# Patient Record
Sex: FEMALE | Race: WHITE | Employment: UNEMPLOYED | URBAN - METROPOLITAN AREA
[De-identification: names, ages, dates, MRNs, and addresses within clinical notes are randomized per-mention and may not be internally consistent; named-entity substitution may affect disease eponyms.]

---

## 2018-10-19 PROBLEM — E66.01 SEVERE OBESITY (HCC): Status: ACTIVE | Noted: 2018-10-19

## 2021-09-02 ENCOUNTER — HOSPITAL ENCOUNTER (OUTPATIENT)
Dept: GENERAL RADIOLOGY | Age: 61
Discharge: HOME | End: 2021-09-02
Payer: COMMERCIAL

## 2021-09-02 DIAGNOSIS — J18.9 PNEUMONIA DUE TO INFECTIOUS ORGANISM, UNSPECIFIED LATERALITY, UNSPECIFIED PART OF LUNG: ICD-10-CM

## 2021-09-02 DIAGNOSIS — U09.9 POST-COVID SYNDROME: ICD-10-CM

## 2021-09-02 PROCEDURE — 71046 X-RAY EXAM CHEST 2 VIEWS: CPT

## 2022-01-25 ENCOUNTER — HOSPITAL ENCOUNTER (OUTPATIENT)
Dept: GENERAL RADIOLOGY | Age: 62
Discharge: HOME | End: 2022-01-25
Payer: COMMERCIAL

## 2022-01-25 DIAGNOSIS — J18.9 HOSPITAL ACQUIRED PNA: ICD-10-CM
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
## 2022-01-25 PROCEDURE — 71046 X-RAY EXAM CHEST 2 VIEWS: CPT

## 2022-03-19 PROBLEM — E66.01 SEVERE OBESITY (HCC): Status: ACTIVE | Noted: 2018-10-19

## 2022-03-29 ENCOUNTER — HOSPITAL ENCOUNTER (OUTPATIENT)
Dept: CT IMAGING | Age: 62
Discharge: HOME | End: 2022-03-29
Attending: NURSE PRACTITIONER
Payer: COMMERCIAL

## 2022-03-29 DIAGNOSIS — U09.9 POST-COVID SYNDROME: ICD-10-CM

## 2022-03-29 PROCEDURE — 71250 CT THORAX DX C-: CPT

## 2022-04-07 NOTE — PROGRESS NOTES
Please let patient know there are some changes in the lungs that could be from [place]. Also, with elevated diaphragm on one side, likely to have shortness of breath due to that. CT scan also questioned if you have possible asthma or airway issue, so will see what your breathing tests look like.

## 2022-06-06 ENCOUNTER — OFFICE VISIT (OUTPATIENT)
Dept: INTERNAL MEDICINE CLINIC | Facility: CLINIC | Age: 62
End: 2022-06-06
Payer: COMMERCIAL

## 2022-06-06 VITALS
SYSTOLIC BLOOD PRESSURE: 128 MMHG | OXYGEN SATURATION: 95 % | HEART RATE: 66 BPM | BODY MASS INDEX: 35.32 KG/M2 | RESPIRATION RATE: 16 BRPM | HEIGHT: 65 IN | DIASTOLIC BLOOD PRESSURE: 80 MMHG | TEMPERATURE: 97.3 F | WEIGHT: 212 LBS

## 2022-06-06 DIAGNOSIS — Z79.899 ENCOUNTER FOR LONG-TERM (CURRENT) USE OF MEDICATIONS: ICD-10-CM

## 2022-06-06 DIAGNOSIS — U09.9 POST-COVID SYNDROME: ICD-10-CM

## 2022-06-06 DIAGNOSIS — E03.9 ACQUIRED HYPOTHYROIDISM: Primary | ICD-10-CM

## 2022-06-06 DIAGNOSIS — F33.0 MILD EPISODE OF RECURRENT MAJOR DEPRESSIVE DISORDER (HCC): ICD-10-CM

## 2022-06-06 PROCEDURE — 99213 OFFICE O/P EST LOW 20 MIN: CPT | Performed by: INTERNAL MEDICINE

## 2022-06-06 ASSESSMENT — PATIENT HEALTH QUESTIONNAIRE - PHQ9
1. LITTLE INTEREST OR PLEASURE IN DOING THINGS: 0
2. FEELING DOWN, DEPRESSED OR HOPELESS: 0
SUM OF ALL RESPONSES TO PHQ9 QUESTIONS 1 & 2: 0
SUM OF ALL RESPONSES TO PHQ QUESTIONS 1-9: 0

## 2022-06-06 NOTE — PROGRESS NOTES
06/06/2022   Location:[facility] [address] [facility]  [place]  Patient #:  [id number]  YOB: 1960        History of Present Illness     Chief Complaint   Patient presents with    Follow-up Chronic Condition     4 month f/u    Post-COVID Symptoms       Ms. Otoole is a 65 y.o. female  who presents for Follow up on chronic medical issues. There is compliance and tolerance with medications. Chronic active medical issues assessed today include  Hypothyroidism, anxiety/depression, pre diabetes, hyperlipidemia. Solis Chang compliant with synthroid taking on an empty stomach. She compliant with Lexapro for anxiety and depression. Synthroid tues/friday takes 1/2 whole on other days. Still has some SOB with activity since COVID last summer. Post COVID disorder  Still fatigued going to gym regularly 5d /w  Still complains of brain fog. Annoying type HA that goes away. Saw pulmonologist 3 weeks ago. Her right hemidiaphragm is elevated. Has PFT not normal. Will repeat study in 6 months. Maybe slowing improving. RA sats 92 - 98.  Avg 93%      Last Labs  CBC:   Lab Results   Component Value Date    WBC 8.0 02/22/2022    RBC 4.80 02/22/2022    HGB 13.9 02/22/2022    HCT 43.9 02/22/2022    MCV 92 02/22/2022    MCH 29.0 02/22/2022    MCHC 31.7 02/22/2022    RDW 13.8 02/22/2022     02/22/2022     CMP:    Lab Results   Component Value Date     02/07/2022    K 5.0 02/07/2022     02/07/2022    CO2 23 02/07/2022    BUN 10 02/07/2022    CREATININE 0.71 02/07/2022    GFRAA 107 02/07/2022    AGRATIO 1.6 03/27/2020    GLUCOSE 122 02/07/2022    PROT 7.5 03/27/2020    LABALBU 4.6 03/27/2020    CALCIUM 10.2 02/07/2022    BILITOT 0.5 03/27/2020    ALKPHOS 67 03/27/2020    AST 32 03/27/2020    ALT 23 03/27/2020     FLP:    Lab Results   Component Value Date    TRIG 122 02/22/2022    HDL 46 02/22/2022    LDLCALC 129 02/22/2022    LABVLDL 26 03/27/2020     TSH:    Lab Results Component Value Date    TSH 2.160 02/07/2022       No Known Allergies  Past Medical History:   Diagnosis Date    Depression     Hypothyroidism      Social History     Socioeconomic History    Marital status:      Spouse name: None    Number of children: None    Years of education: None    Highest education level: None   Occupational History    None   Tobacco Use    Smoking status: Never Smoker    Smokeless tobacco: Never Used   Substance and Sexual Activity    Alcohol use: No    Drug use: No    Sexual activity: None   Other Topics Concern    None   Social History Narrative    None     Social Determinants of Health     Financial Resource Strain:     Difficulty of Paying Living Expenses: Not on file   Food Insecurity:     Worried About Running Out of Food in the Last Year: Not on file    Jodi of Food in the Last Year: Not on file   Transportation Needs:     Lack of Transportation (Medical): Not on file    Lack of Transportation (Non-Medical):  Not on file   Physical Activity:     Days of Exercise per Week: Not on file    Minutes of Exercise per Session: Not on file   Stress:     Feeling of Stress : Not on file   Social Connections:     Frequency of Communication with Friends and Family: Not on file    Frequency of Social Gatherings with Friends and Family: Not on file    Attends Buddhism Services: Not on file    Active Member of [address] [place] [address] or Organizations: Not on file    Attends Club or Organization Meetings: Not on file    Marital Status: Not on file   Intimate Partner Violence:     Fear of Current or Ex-Partner: Not on file    Emotionally Abused: Not on file    Physically Abused: Not on file    Sexually Abused: Not on file   Housing Stability:     Unable to Pay for Housing in the Last Year: Not on file    Number of [place] in the Last Year: Not on file    Unstable Housing in the Last Year: Not on file     Past Surgical History:   Procedure Laterality Date    CHOLECYSTECTOMY  1990     Family History   Problem Relation Age of Onset    Depression Mother     Heart Attack Father     Diabetes Mother     Hypertension Mother      Current Outpatient Medications   Medication Sig Dispense Refill    MAGNESIUM CITRATE PO Take by mouth      Biotin 10 MG CAPS Take by mouth      cyanocobalamin 1000 MCG tablet Take 1,000 mcg by mouth daily      escitalopram (LEXAPRO) 10 MG tablet Take 10 mg by mouth daily      levothyroxine (SYNTHROID) 75 MCG tablet 75mcg Mon, Wed, Thurs, Sat, Sunday, 37.5mcg on Tues, Friday       No current facility-administered medications for this visit. Health Maintenance   Topic Date Due    COVID-19 Vaccine (1) Never done    Pneumococcal 0-64 years Vaccine (1 - PCV) Never done    HIV screen  Never done    DTaP/Tdap/Td vaccine (1 - Tdap) Never done    Cervical cancer screen  Never done    Colorectal Cancer Screen  Never done    Shingles vaccine (1 of 2) Never done    Flu vaccine (Season Ended) 09/01/2022    Depression Screen  02/07/2023    A1C test (Diabetic or Prediabetic)  02/22/2023    Breast cancer screen  03/31/2024    Lipids  02/22/2027    Hepatitis C screen  Completed    Hepatitis A vaccine  Aged Out    Hepatitis B vaccine  Aged Out    Hib vaccine  Aged Out    Meningococcal (ACWY) vaccine  Aged Out             Review of Systems  Review of Systems   Constitutional: Positive for fatigue. Negative for fever. Eyes: Negative for visual disturbance. Respiratory: Negative for cough and shortness of breath. Cardiovascular: Negative for chest pain. Gastrointestinal: Negative for abdominal pain. Genitourinary: Negative for difficulty urinating. Neurological: Positive for headaches. Psychiatric/Behavioral: Positive for decreased concentration and dysphoric mood.        /80 (Site: Left Upper Arm, Position: Sitting)   Pulse 66   Temp 97.3 °F (36.3 °C) (Temporal)   Resp 16   Ht 5' 5\" (1.651 m)   Wt 212 lb (96.2 kg) SpO2 95%   BMI 35.28 kg/m²     Wt Readings from Last 3 Encounters:   06/06/22 212 lb (96.2 kg)   05/16/22 215 lb (97.5 kg)   02/07/22 215 lb 9.6 oz (97.8 kg)       Physical Exam    Physical Exam  Vitals and nursing note reviewed. Constitutional:       General: She is not in acute distress. Appearance: Normal appearance. She is not ill-appearing. HENT:      Head: Normocephalic and atraumatic. Cardiovascular:      Rate and Rhythm: Normal rate and regular rhythm. Pulmonary:      Effort: Pulmonary effort is normal.      Breath sounds: Normal breath sounds. No wheezing. Abdominal:      General: Bowel sounds are normal.   Skin:     General: Skin is warm and dry. Neurological:      General: No focal deficit present. Mental Status: She is alert. Assessment & Plan    Encounter Diagnoses   Name Primary?  Acquired hypothyroidism Yes    Mild episode of recurrent major depressive disorder ([facility] [address]     Post-COVID syndrome     Encounter for long-term (current) use of medications      Chronic medical issues are stable. No change in medications. Discussed the importance of regular exercise and a healthy diet. Return in about 6 months (around 12/6/2022) for routine follow up of chronic medical issues, fasting labs.         Sally Martinez MD

## 2022-06-15 ASSESSMENT — ENCOUNTER SYMPTOMS
COUGH: 0
SHORTNESS OF BREATH: 0
ABDOMINAL PAIN: 0

## 2022-08-11 RX ORDER — LEVOTHYROXINE SODIUM 75 UG/1
TABLET ORAL
Qty: 90 TABLET | Refills: 3 | Status: SHIPPED | OUTPATIENT
Start: 2022-08-11

## 2023-01-10 ENCOUNTER — OFFICE VISIT (OUTPATIENT)
Dept: PULMONOLOGY | Age: 63
End: 2023-01-10
Payer: COMMERCIAL

## 2023-01-10 VITALS
HEIGHT: 65 IN | BODY MASS INDEX: 30.32 KG/M2 | TEMPERATURE: 98 F | HEART RATE: 60 BPM | DIASTOLIC BLOOD PRESSURE: 80 MMHG | OXYGEN SATURATION: 97 % | WEIGHT: 182 LBS | SYSTOLIC BLOOD PRESSURE: 140 MMHG | RESPIRATION RATE: 20 BRPM

## 2023-01-10 DIAGNOSIS — U09.9 POST COVID-19 CONDITION, UNSPECIFIED: ICD-10-CM

## 2023-01-10 DIAGNOSIS — R06.09 DYSPNEA ON EXERTION: ICD-10-CM

## 2023-01-10 DIAGNOSIS — R13.10 DYSPHAGIA, UNSPECIFIED TYPE: Primary | ICD-10-CM

## 2023-01-10 PROCEDURE — 99214 OFFICE O/P EST MOD 30 MIN: CPT | Performed by: NURSE PRACTITIONER

## 2023-01-10 NOTE — PROGRESS NOTES
Name:  Luz Choi  YOB: 1960   MRN: [id number]      Office Visit: 1/10/2023        ASSESSMENT AND PLAN:  (Medical Decision Making)    Impression: 59-year-old female that is post COVID has improved significantly since. She is no longer on oxygen and not requiring any inhalers. 1. Dysphagia, unspecified type  --referral to GI for evaluation  --encouraged to start OTC omeprazole since she does have some reflux symptoms. --with swallowing issues and vomiting, biggest concern is potential aspiration. Encouraged her to really follow-up on these issues and have them taken care of.  - Ambulatory referral to Gastroenterology    2. Dyspnea on exertion  --Improving although still having some issues with exercise tolerance. Will refer to [facility] pulmonary rehab and see if her insurance will cover it better at this time. - Ambulatory referral to Pulmonary Rehab    3. Post covid-19 condition, unspecified  --No longer hypoxic, CT with some scarring and changes, but no ongoing pulmonary symptoms. No orders of the defined types were placed in this encounter. No orders of the defined types were placed in this encounter. Follow-up and Dispositions    Return if symptoms worsen or fail to improve. ALEXANDRO Dick - CNP    Collaborating physician is Stefani Barrett MD    _________________________________________________________________________    HISTORY OF PRESENT ILLNESS:    Ms. Pamela Garcia is a 65 y.o. female who [facility] for  Post-COVID Symptoms    Pamela Garcia is a 61 yo female that is here for follow up post [facility] hospitalization. Her cough has resolved and she has minimal shortness of breath, but does have fatigue with exertion. She is no longer needing her oxygen and has turned it back in. She continues to monitor her O2 and the lowest she seen is 93%. HRCT showed mild scarring, likely  COVID changes and a mild elevation of the hemidiaphragm. CPFT's with mild to moderate restriction and mild decrease in DLCO. She is concerned today that when she swallows, food seems to get stuck in her chest.  She has also had significant vomiting issues. She has not seen GI in a while. Admitted to [facility] from July 19 through August 7 due to Covid pneumonia. Did require intubation during hospitalization and post extubation required some BiPAP assistance. Patient received Decadron, convalescent plasma. CT of the chest negative for PE. Initially did not require antibiotics, but ended up on Rocephin IV. Patient denies any prior lung disease prior to Covid. She has never been a smoker, but does states she had secondhand exposure through her mother. She does have 2 dogs at home   Denies any personal history of cancer. Family history of breast cancer in a paternal aunt. REVIEW OF SYSTEMS: 10 point review of systems is negative except as reported in HPI. PHYSICAL EXAM: Body mass index is 30.29 kg/m². Vitals:    01/10/23 0923   BP: (!) 140/80   Pulse: 60   Resp: 20   Temp: 98 °F (36.7 °C)   TempSrc: Temporal   SpO2: 97%   Weight: 182 lb (82.6 kg)   Height: 5' 5\" (1.651 m)         General:   Alert, cooperative, no distress, appears stated age. Eyes:   Conjunctivae/corneas clear. PERRL        Mouth/Throat:  Lips, mucosa, and tongue normal. Teeth and gums normal.        Lungs:   Bilateral lung sounds are clear on room air, saturations 97%. She is     Heart:   Regular rate and rhythm, S1, S2 normal, no murmur, click, rub or gallop. Abdomen:    Soft, non-tender. Extremities:  Extremities normal, atraumatic, no cyanosis or edema.      Skin:  Skin color normal. No rashes or lesions     Neurologic:  A&Ox3     DIAGNOSTIC TESTS:                                                                                    LABS:   Lab Results   Component Value Date/Time    WBC 8.0 02/22/2022 08:30 AM    HGB 13.9 02/22/2022 08:30 AM    HCT 43.9 02/22/2022 08:30 AM     02/22/2022 08:30 AM    TSH 2.160 02/07/2022 09:04 AM     Imaging: I performed an independent interpretation of the patient's images. CXR:    1/25/22          XR CHEST STANDARD TWO VW 01/25/2022    Narrative  EXAM: XR CHEST PA LAT  INDICATION: Hospital-acquired pneumonia. COMPARISON: Chest radiograph, 9/2/2021. FINDINGS:  The cardiomediastinal silhouette is within normal limits. No pleural effusion or  pneumothorax. There are increased interstitial markings bilaterally with  peripheral predominance, slightly improved from prior. Osseous structures are  grossly intact. Impression  Increased peripheral interstitial markings bilaterally, slightly improved from  prior. Findings likely represent sequela of recent pneumonia. No new  consolidation. CT Chest:    Inspiratory                                                      Expiratory            CT CHEST HIGH RESOLUTION 03/29/2022    Narrative  EXAMINATION: Chest CT without contrast    INDICATION:  Post COVID 19 syndrome    COMPARISON: Chest radiographs, 1/25/2022 and 9/2/2021. TECHNIQUE: Multiple axial images were obtained through the chest. High  resolution prone and supine imaging was performed. Radiation dose reduction  techniques were used for this study: All CT scans performed at this facility  use one or all of the following: Automated exposure control, adjustment of the  mA and/or kVp according to patient's size, iterative reconstruction. FINDINGS:    LUNGS: There are subpleural reticular and bandlike opacities along the periphery  of both lungs without basilar predominance. No evidence of honeycombing. There  are scattered areas of air trapping noted on the expiratory series. No  bronchiectasis. No pleural effusion. MEDIASTINUM visualized thyroid is normal. Thoracic esophagus is unremarkable. The heart is within normal limits in size without pericardial effusion. Great  vessels are normal in caliber.  No mediastinal or hilar lymphadenopathy. Soft tissues: No axillary adenopathy. Regional soft tissues are unremarkable. No  acute or aggressive osseous abnormality. Upper abdomen: Status post cholecystectomy with mild prominence of the common  bile duct likely related to reservoir effect. Punctate nonobstructing right  renal calculus. Impression  Subpleural reticular and bandlike fibrotic changes involving both lungs with  evidence of air trapping, compatible with post COVID sequelae. Nuclear Medicine: No results found for this or any previous visit from the past 3650 days. PFTs:. Date:     4/22/22         FVC     2.23-66%         FEV1     1.84-72%         FEV1/FVC     86%         FEF 25-75%     2.%         Bronchodilator Response     none         TLC     3.27-63%         RV     10.5-52%              DLCO     61%         FeNO: No results found for this or any previous visit. FeNO and Likelihood of Eosinophilic Asthma   Unlikely Intermediate Likely   <25 ppb 25-50 ppb >50ppb   Exercise Oximetry:  Echo: No results found for this or any previous visit from the past 3650 days. [facility] Reference Info:                                                                                                                  Past Medical History:   Diagnosis Date    Depression     Hypothyroidism         Tobacco Use      Smoking status: Never      Smokeless tobacco: Never    No Known Allergies  Current Outpatient Medications   Medication Instructions    Biotin 10 MG CAPS Oral    cyanocobalamin 1,000 mcg, Oral, DAILY    escitalopram (LEXAPRO) 10 mg, Oral, DAILY    EUTHYROX 75 MCG tablet TAKE ONE TABLET BY MOUTH ON MONDAY, WEDNESDAY, THURSDAY, SATURDAY, AND SUNDAY.  TAKE 37.5 MCG ON TUESDAYS AND FRIDAY AS DIRECTED    MAGNESIUM CITRATE PO Oral

## 2023-01-25 ENCOUNTER — OFFICE VISIT (OUTPATIENT)
Dept: GASTROENTEROLOGY | Age: 63
End: 2023-01-25
Payer: COMMERCIAL

## 2023-01-25 ENCOUNTER — PREP FOR PROCEDURE (OUTPATIENT)
Dept: GASTROENTEROLOGY | Age: 63
End: 2023-01-25

## 2023-01-25 VITALS
RESPIRATION RATE: 18 BRPM | SYSTOLIC BLOOD PRESSURE: 146 MMHG | OXYGEN SATURATION: 96 % | TEMPERATURE: 97.7 F | HEIGHT: 65 IN | DIASTOLIC BLOOD PRESSURE: 90 MMHG | WEIGHT: 212.2 LBS | HEART RATE: 63 BPM | BODY MASS INDEX: 35.35 KG/M2

## 2023-01-25 DIAGNOSIS — R11.2 NAUSEA AND VOMITING IN ADULT: ICD-10-CM

## 2023-01-25 DIAGNOSIS — R10.13 EPIGASTRIC ABDOMINAL PAIN: ICD-10-CM

## 2023-01-25 DIAGNOSIS — R13.19 ESOPHAGEAL DYSPHAGIA: Primary | ICD-10-CM

## 2023-01-25 PROCEDURE — 99203 OFFICE O/P NEW LOW 30 MIN: CPT | Performed by: INTERNAL MEDICINE

## 2023-01-25 RX ORDER — SODIUM CHLORIDE 9 MG/ML
25 INJECTION, SOLUTION INTRAVENOUS PRN
Status: CANCELLED | OUTPATIENT
Start: 2023-01-25

## 2023-01-25 RX ORDER — SODIUM CHLORIDE 0.9 % (FLUSH) 0.9 %
5-40 SYRINGE (ML) INJECTION EVERY 12 HOURS SCHEDULED
Status: CANCELLED | OUTPATIENT
Start: 2023-01-25

## 2023-01-25 RX ORDER — SODIUM CHLORIDE 0.9 % (FLUSH) 0.9 %
5-40 SYRINGE (ML) INJECTION PRN
Status: CANCELLED | OUTPATIENT
Start: 2023-01-25

## 2023-01-25 ASSESSMENT — ANXIETY QUESTIONNAIRES
2. NOT BEING ABLE TO STOP OR CONTROL WORRYING: 0
6. BECOMING EASILY ANNOYED OR IRRITABLE: 0
GAD7 TOTAL SCORE: 0
7. FEELING AFRAID AS IF SOMETHING AWFUL MIGHT HAPPEN: 0
1. FEELING NERVOUS, ANXIOUS, OR ON EDGE: 0
4. TROUBLE RELAXING: 0
3. WORRYING TOO MUCH ABOUT DIFFERENT THINGS: 0
5. BEING SO RESTLESS THAT IT IS HARD TO SIT STILL: 0
IF YOU CHECKED OFF ANY PROBLEMS ON THIS QUESTIONNAIRE, HOW DIFFICULT HAVE THESE PROBLEMS MADE IT FOR YOU TO DO YOUR WORK, TAKE CARE OF THINGS AT HOME, OR GET ALONG WITH OTHER PEOPLE: NOT DIFFICULT AT ALL

## 2023-01-25 ASSESSMENT — PATIENT HEALTH QUESTIONNAIRE - PHQ9
4. FEELING TIRED OR HAVING LITTLE ENERGY: 0
9. THOUGHTS THAT YOU WOULD BE BETTER OFF DEAD, OR OF HURTING YOURSELF: 0
8. MOVING OR SPEAKING SO SLOWLY THAT OTHER PEOPLE COULD HAVE NOTICED. OR THE OPPOSITE, BEING SO FIGETY OR RESTLESS THAT YOU HAVE BEEN MOVING AROUND A LOT MORE THAN USUAL: 0
1. LITTLE INTEREST OR PLEASURE IN DOING THINGS: 0
5. POOR APPETITE OR OVEREATING: 0
7. TROUBLE CONCENTRATING ON THINGS, SUCH AS READING THE NEWSPAPER OR WATCHING TELEVISION: 0
SUM OF ALL RESPONSES TO PHQ QUESTIONS 1-9: 0
2. FEELING DOWN, DEPRESSED OR HOPELESS: 0
SUM OF ALL RESPONSES TO PHQ QUESTIONS 1-9: 0
SUM OF ALL RESPONSES TO PHQ QUESTIONS 1-9: 0
10. IF YOU CHECKED OFF ANY PROBLEMS, HOW DIFFICULT HAVE THESE PROBLEMS MADE IT FOR YOU TO DO YOUR WORK, TAKE CARE OF THINGS AT HOME, OR GET ALONG WITH OTHER PEOPLE: 0
SUM OF ALL RESPONSES TO PHQ QUESTIONS 1-9: 0
SUM OF ALL RESPONSES TO PHQ9 QUESTIONS 1 & 2: 0
3. TROUBLE FALLING OR STAYING ASLEEP: 0
6. FEELING BAD ABOUT YOURSELF - OR THAT YOU ARE A FAILURE OR HAVE LET YOURSELF OR YOUR FAMILY DOWN: 0

## 2023-01-25 ASSESSMENT — ENCOUNTER SYMPTOMS
VOMITING: 1
ABDOMINAL PAIN: 0
NAUSEA: 1
BLOOD IN STOOL: 0
COLOR CHANGE: 0
TROUBLE SWALLOWING: 0
SHORTNESS OF BREATH: 0

## 2023-01-25 NOTE — H&P (VIEW-ONLY)
Joelle Zaldivar (:  1960) is a 65 y.o. female, new patient here for evaluation of the following chief complaint(s):  New Patient and Dysphagia (Dysphagia, unspecified type)         ASSESSMENT/PLAN:  1. Esophageal dysphagia  2. Nausea and vomiting in adult  3. Epigastric abdominal pain           Subjective   SUBJECTIVE/OBJECTIVE  Patient present with recurrent episode of solid food dysphagia she feels a sensation in the middle of her chest that radiated to her right side of the neck. Patient was hospitalized and intubated with secondary paralysis of the right diaphragm. She has been recovering well from her injury. She denied any chronic reflux disease. She has had some episodes of mild epigastric pain and vomiting. Denied any bleeding denies any weight loss any melena no prior endoscopic evaluation    Past Medical History:   Diagnosis Date    Depression     Hypothyroidism        Past Surgical History:   Procedure Laterality Date    CHOLECYSTECTOMY               No Known Allergies       Review of Systems   Constitutional:  Negative for appetite change. HENT:  Negative for mouth sores and trouble swallowing. Respiratory:  Negative for shortness of breath. Cardiovascular:  Negative for chest pain. Gastrointestinal:  Positive for nausea and vomiting. Negative for abdominal pain and blood in stool. Skin:  Negative for color change. Allergic/Immunologic: Negative for food allergies. Neurological:  Negative for seizures and weakness. Hematological:  Does not bruise/bleed easily. Objective   Physical Exam  HENT:      Head: Normocephalic. Mouth/Throat:      Mouth: Mucous membranes are moist.   Eyes:      General: No scleral icterus. Cardiovascular:      Rate and Rhythm: Normal rate and regular rhythm. Pulmonary:      Effort: No respiratory distress. Abdominal:      General: There is no distension. Tenderness: There is no abdominal tenderness. There is no rebound. Lymphadenopathy:      Cervical: No cervical adenopathy. Skin:     Coloration: Skin is not jaundiced. Findings: No bruising. Neurological:      General: No focal deficit present. Mental Status: She is alert. Current Outpatient Medications   Medication Sig Dispense Refill    EUTHYROX 75 MCG tablet TAKE ONE TABLET BY MOUTH ON MONDAY, WEDNESDAY, THURSDAY, SATURDAY, AND SUNDAY. TAKE 37.5 MCG ON TUESDAYS AND FRIDAY AS DIRECTED 90 tablet 3    MAGNESIUM CITRATE PO Take by mouth      Biotin 10 MG CAPS Take by mouth      cyanocobalamin 1000 MCG tablet Take 1,000 mcg by mouth daily      escitalopram (LEXAPRO) 10 MG tablet Take 10 mg by mouth daily       No current facility-administered medications for this visit. Family History   Problem Relation Age of Onset    Depression Mother     Heart Attack Father     Diabetes Mother     Hypertension Mother        Return for endoscopy. An electronic signature was used to authenticate this note.     --Azael Da Silva MD

## 2023-01-25 NOTE — PROGRESS NOTES
Rekha Castellano (:  1960) is a 65 y.o. female, new patient here for evaluation of the following chief complaint(s):  New Patient and Dysphagia (Dysphagia, unspecified type)         ASSESSMENT/PLAN:  1. Esophageal dysphagia  2. Nausea and vomiting in adult  3. Epigastric abdominal pain           Subjective   SUBJECTIVE/OBJECTIVE  Patient present with recurrent episode of solid food dysphagia she feels a sensation in the middle of her chest that radiated to her right side of the neck. Patient was hospitalized and intubated with secondary paralysis of the right diaphragm. She has been recovering well from her injury. She denied any chronic reflux disease. She has had some episodes of mild epigastric pain and vomiting. Denied any bleeding denies any weight loss any melena no prior endoscopic evaluation    Past Medical History:   Diagnosis Date    Depression     Hypothyroidism        Past Surgical History:   Procedure Laterality Date    CHOLECYSTECTOMY               No Known Allergies       Review of Systems   Constitutional:  Negative for appetite change. HENT:  Negative for mouth sores and trouble swallowing. Respiratory:  Negative for shortness of breath. Cardiovascular:  Negative for chest pain. Gastrointestinal:  Positive for nausea and vomiting. Negative for abdominal pain and blood in stool. Skin:  Negative for color change. Allergic/Immunologic: Negative for food allergies. Neurological:  Negative for seizures and weakness. Hematological:  Does not bruise/bleed easily. Objective   Physical Exam  HENT:      Head: Normocephalic. Mouth/Throat:      Mouth: Mucous membranes are moist.   Eyes:      General: No scleral icterus. Cardiovascular:      Rate and Rhythm: Normal rate and regular rhythm. Pulmonary:      Effort: No respiratory distress. Abdominal:      General: There is no distension. Tenderness: There is no abdominal tenderness. There is no rebound. Lymphadenopathy:      Cervical: No cervical adenopathy. Skin:     Coloration: Skin is not jaundiced. Findings: No bruising. Neurological:      General: No focal deficit present. Mental Status: She is alert. Current Outpatient Medications   Medication Sig Dispense Refill    EUTHYROX 75 MCG tablet TAKE ONE TABLET BY MOUTH ON MONDAY, WEDNESDAY, THURSDAY, SATURDAY, AND SUNDAY. TAKE 37.5 MCG ON TUESDAYS AND FRIDAY AS DIRECTED 90 tablet 3    MAGNESIUM CITRATE PO Take by mouth      Biotin 10 MG CAPS Take by mouth      cyanocobalamin 1000 MCG tablet Take 1,000 mcg by mouth daily      escitalopram (LEXAPRO) 10 MG tablet Take 10 mg by mouth daily       No current facility-administered medications for this visit. Family History   Problem Relation Age of Onset    Depression Mother     Heart Attack Father     Diabetes Mother     Hypertension Mother        Return for endoscopy. An electronic signature was used to authenticate this note.     --Antonella Mei MD

## 2023-01-26 NOTE — PERIOP NOTE
Patient verified name, , and procedure. Type: 1a; abbreviated assessment per anesthesia guidelines  Labs per surgeon: none  Labs per anesthesia: none      Instructed pt that they will be notified by the doctor office for time of arrival to GI lab. If any questions please call the GI lab at [phone]. Follow diet and prep instructions per office. May have clear liquids until 4 hours prior to time of arrival.    [place] or shower the night before and the am of surgery with antibacterial soap. No lotions, oils, powders, cologne on skin. No make up, eye make up or jewelry. Wear loose fitting comfortable, clean clothing. Must have adult present in building the entire time . Medications for the day of procedure thyroid medicine. The following discharge instructions reviewed with patient: medication given during procedure may cause drowsiness for several hours, therefore, do not drive or operate machinery for remainder of the day, no alcohol on the day of your procedure, resume regular diet and activity unless otherwise directed, for mild sore throat you may use Cepacol throat lozenges or warm salt water gargles as needed, call your physician for any problems or questions. Patient verbalizes understanding.

## 2023-01-30 ENCOUNTER — ANESTHESIA EVENT (OUTPATIENT)
Dept: ENDOSCOPY | Age: 63
End: 2023-01-30
Payer: COMMERCIAL

## 2023-01-30 RX ORDER — ONDANSETRON 2 MG/ML
4 INJECTION INTRAMUSCULAR; INTRAVENOUS
Status: CANCELLED | OUTPATIENT
Start: 2023-01-30 | End: 2023-01-31

## 2023-01-30 RX ORDER — SODIUM CHLORIDE 0.9 % (FLUSH) 0.9 %
5-40 SYRINGE (ML) INJECTION EVERY 12 HOURS SCHEDULED
Status: CANCELLED | OUTPATIENT
Start: 2023-01-30

## 2023-01-30 RX ORDER — SODIUM CHLORIDE 0.9 % (FLUSH) 0.9 %
5-40 SYRINGE (ML) INJECTION PRN
Status: CANCELLED | OUTPATIENT
Start: 2023-01-30

## 2023-01-30 RX ORDER — SODIUM CHLORIDE 9 MG/ML
INJECTION, SOLUTION INTRAVENOUS PRN
Status: CANCELLED | OUTPATIENT
Start: 2023-01-30

## 2023-01-31 ENCOUNTER — TELEPHONE (OUTPATIENT)
Dept: GASTROENTEROLOGY | Age: 63
End: 2023-01-31

## 2023-01-31 ENCOUNTER — ANESTHESIA (OUTPATIENT)
Dept: ENDOSCOPY | Age: 63
End: 2023-01-31
Payer: COMMERCIAL

## 2023-01-31 ENCOUNTER — HOSPITAL ENCOUNTER (OUTPATIENT)
Age: 63
Setting detail: OUTPATIENT SURGERY
Discharge: HOME | End: 2023-01-31
Attending: INTERNAL MEDICINE | Admitting: INTERNAL MEDICINE
Payer: COMMERCIAL

## 2023-01-31 VITALS
HEIGHT: 65 IN | BODY MASS INDEX: 35.64 KG/M2 | HEART RATE: 61 BPM | SYSTOLIC BLOOD PRESSURE: 139 MMHG | OXYGEN SATURATION: 93 % | DIASTOLIC BLOOD PRESSURE: 81 MMHG | TEMPERATURE: 97.2 F | RESPIRATION RATE: 20 BRPM | WEIGHT: 213.9 LBS

## 2023-01-31 DIAGNOSIS — R10.13 EPIGASTRIC ABDOMINAL PAIN: ICD-10-CM

## 2023-01-31 DIAGNOSIS — R13.19 ESOPHAGEAL DYSPHAGIA: ICD-10-CM

## 2023-01-31 DIAGNOSIS — R11.2 NAUSEA AND VOMITING IN ADULT: ICD-10-CM

## 2023-01-31 PROCEDURE — 7100000011 HC PHASE II RECOVERY - ADDTL 15 MIN: Performed by: INTERNAL MEDICINE

## 2023-01-31 PROCEDURE — 3609012400 HC EGD TRANSORAL BIOPSY SINGLE/MULTIPLE: Performed by: INTERNAL MEDICINE

## 2023-01-31 PROCEDURE — 88312 SPECIAL STAINS GROUP 1: CPT

## 2023-01-31 PROCEDURE — 2500000003 HC RX 250 WO HCPCS: Performed by: NURSE ANESTHETIST, CERTIFIED REGISTERED

## 2023-01-31 PROCEDURE — 7100000010 HC PHASE II RECOVERY - FIRST 15 MIN: Performed by: INTERNAL MEDICINE

## 2023-01-31 PROCEDURE — 43239 EGD BIOPSY SINGLE/MULTIPLE: CPT | Performed by: INTERNAL MEDICINE

## 2023-01-31 PROCEDURE — 2709999900 HC NON-CHARGEABLE SUPPLY: Performed by: INTERNAL MEDICINE

## 2023-01-31 PROCEDURE — 88305 TISSUE EXAM BY PATHOLOGIST: CPT

## 2023-01-31 PROCEDURE — 3700000000 HC ANESTHESIA ATTENDED CARE: Performed by: INTERNAL MEDICINE

## 2023-01-31 PROCEDURE — 2580000003 HC RX 258: Performed by: ANESTHESIOLOGY

## 2023-01-31 PROCEDURE — 6360000002 HC RX W HCPCS: Performed by: NURSE ANESTHETIST, CERTIFIED REGISTERED

## 2023-01-31 PROCEDURE — 43249 ESOPH EGD DILATION <30 MM: CPT | Performed by: INTERNAL MEDICINE

## 2023-01-31 PROCEDURE — C1726 CATH, BAL DIL, NON-VASCULAR: HCPCS | Performed by: INTERNAL MEDICINE

## 2023-01-31 RX ORDER — SODIUM CHLORIDE 0.9 % (FLUSH) 0.9 %
5-40 SYRINGE (ML) INJECTION PRN
Status: DISCONTINUED | OUTPATIENT
Start: 2023-01-31 | End: 2023-01-31 | Stop reason: HOSPADM

## 2023-01-31 RX ORDER — SODIUM CHLORIDE 0.9 % (FLUSH) 0.9 %
5-40 SYRINGE (ML) INJECTION EVERY 12 HOURS SCHEDULED
Status: DISCONTINUED | OUTPATIENT
Start: 2023-01-31 | End: 2023-01-31 | Stop reason: HOSPADM

## 2023-01-31 RX ORDER — PROPOFOL 10 MG/ML
INJECTION, EMULSION INTRAVENOUS PRN
Status: DISCONTINUED | OUTPATIENT
Start: 2023-01-31 | End: 2023-01-31 | Stop reason: SDUPTHER

## 2023-01-31 RX ORDER — SODIUM CHLORIDE, SODIUM LACTATE, POTASSIUM CHLORIDE, CALCIUM CHLORIDE 600; 310; 30; 20 MG/100ML; MG/100ML; MG/100ML; MG/100ML
INJECTION, SOLUTION INTRAVENOUS CONTINUOUS
Status: DISCONTINUED | OUTPATIENT
Start: 2023-01-31 | End: 2023-01-31 | Stop reason: HOSPADM

## 2023-01-31 RX ORDER — SODIUM CHLORIDE 9 MG/ML
INJECTION, SOLUTION INTRAVENOUS PRN
Status: DISCONTINUED | OUTPATIENT
Start: 2023-01-31 | End: 2023-01-31 | Stop reason: HOSPADM

## 2023-01-31 RX ORDER — LIDOCAINE HYDROCHLORIDE 20 MG/ML
INJECTION, SOLUTION EPIDURAL; INFILTRATION; INTRACAUDAL; PERINEURAL PRN
Status: DISCONTINUED | OUTPATIENT
Start: 2023-01-31 | End: 2023-01-31 | Stop reason: SDUPTHER

## 2023-01-31 RX ORDER — LIDOCAINE HYDROCHLORIDE 10 MG/ML
1 INJECTION, SOLUTION INFILTRATION; PERINEURAL
Status: DISCONTINUED | OUTPATIENT
Start: 2023-01-31 | End: 2023-01-31 | Stop reason: HOSPADM

## 2023-01-31 RX ORDER — SODIUM CHLORIDE 9 MG/ML
25 INJECTION, SOLUTION INTRAVENOUS PRN
Status: DISCONTINUED | OUTPATIENT
Start: 2023-01-31 | End: 2023-01-31 | Stop reason: HOSPADM

## 2023-01-31 RX ADMIN — PROPOFOL 50 MG: 10 INJECTION, EMULSION INTRAVENOUS at 09:13

## 2023-01-31 RX ADMIN — SODIUM CHLORIDE, SODIUM LACTATE, POTASSIUM CHLORIDE, AND CALCIUM CHLORIDE: 600; 310; 30; 20 INJECTION, SOLUTION INTRAVENOUS at 09:19

## 2023-01-31 RX ADMIN — LIDOCAINE HYDROCHLORIDE 25 MG: 20 INJECTION, SOLUTION EPIDURAL; INFILTRATION; INTRACAUDAL; PERINEURAL at 09:13

## 2023-01-31 RX ADMIN — PROPOFOL 180 MCG/KG/MIN: 10 INJECTION, EMULSION INTRAVENOUS at 09:15

## 2023-01-31 ASSESSMENT — PAIN SCALES - GENERAL
PAINLEVEL_OUTOF10: 0
PAINLEVEL_OUTOF10: 0
PAINLEVEL_OUTOF10: 2
PAINLEVEL_OUTOF10: 2

## 2023-01-31 ASSESSMENT — PAIN - FUNCTIONAL ASSESSMENT: PAIN_FUNCTIONAL_ASSESSMENT: 0-10

## 2023-01-31 NOTE — DISCHARGE INSTRUCTIONS
Gastrointestinal Esophagogastroduodenoscopy (EGD) - Upper Exam Discharge Instructions    1. Call Dr. Ella Montano at [phone] for any problems or questions. 2. Contact the doctor's office for follow up appointment as directed. 3. Medication may cause drowsiness for several hours, therefore:  Do not drive or operate machinery for remainder of the day. No alcohol today. Do not make any important or legal decisions for 24 hours. Do not sign any legal documents for 24 hours. 5. Ordinarily, you may resume regular diet and activity after exam unless otherwise specified by your physician. 6. For mild soreness in your throat you may use Cepacol throat lozenges or warm  salt-water gargles as needed.

## 2023-01-31 NOTE — INTERVAL H&P NOTE
Update History & Physical    The patient's History and Physical of January 25,  was reviewed with the patient and I examined the patient. There was no change. The surgical site was confirmed by the patient and me. Plan: The risks, benefits, expected outcome, and alternative to the recommended procedure have been discussed with the patient. Patient understands and wants to proceed with the procedure.      Electronically signed by Ranjan Mo MD on 1/31/2023 at 8:32 AM

## 2023-01-31 NOTE — PROGRESS NOTES
0998-Discharge instructions were reviewed with patient and pts spouse, Reji Ortiz. An opportunity was given for questions. . Patient and Reji Juan Josedavon verbalized understanding, and has no questions at this time. 1703-Peripheral IV removed. Catheter intact. Dressing applied. No abnormalities. 1005-To lobby via wheelchair accompanied by staff. Discharged to home in good condition via private vehicle.

## 2023-01-31 NOTE — PROCEDURES
Endoscopy Note          Operative Report    Patient: Paul Rodriguez MRN: [id number]      YOB: 1960  Age: 65 y.o. Sex: female            Indications:   Esophageal dysphagia    Preoperative Evaluation: The patient was evaluated prior to the procedure in the GI lab admission area, the patient ASA was recorded . Consent was obtained from the patient with the risk of perforation bleeding and aspiration. Anesthesia: IGGY-per anesthesia    Findings: Nonobstructive muscular rings noted above the GE junction. Dilated using 20 mm meter through-the-scope balloon and tearing with biopsy forceps. Mild generalized chronic gastritis biopsy from the antrum and body was taken. Open pylorus normal duodenal mucosa    Postoperative Diagnosis: 1-benign esophageal stricture.   2-chronic gastritis    99028 Esophagogastroduodenoscopy, Flexible, transoral; biopsy; single or multiple and 95587 Esophagogastroduodenoscopy, Flexible, transoral; transendoscopic balloon dilation of esophagus      Recommendations: Await Pathology      Signed By:  Luis Eduardo Mullen MD     January 31, 2023

## 2023-01-31 NOTE — ANESTHESIA POSTPROCEDURE EVALUATION
Department of Anesthesiology  Postprocedure Note    Patient: Ramón Holt  MRN: [id number]  YOB: 1960  Date of evaluation: 1/31/2023      Procedure Summary     Date: 01/31/23 [place] / [place] [facility] [place] [facility] [place]OSCOPY    Anesthesia Start: 0911 Anesthesia Stop: 3990    Procedure: EGD BIOPSY/BALLOON DILATION (Upper GI Region) Diagnosis:       Esophageal dysphagia      Nausea and vomiting in adult      Epigastric abdominal pain      (Esophageal dysphagia [R13.19])      (Nausea and vomiting in adult [R11.2])      (Epigastric abdominal pain [R10.13])    Providers: Bhakti Anton MD Responsible Provider: Rafat Merida MD    Anesthesia Type: TIVA ASA Status: 2          Anesthesia Type: No value filed. Sapphire Phase I:      Sapphire Phase II: Sapphire Score: 8      Anesthesia Post Evaluation    Patient location during evaluation: PACU  Patient participation: complete - patient participated  Level of consciousness: awake and alert  Airway patency: patent  Nausea & Vomiting: no nausea and no vomiting  Complications: no  Cardiovascular status: hemodynamically stable  Respiratory status: acceptable  Hydration status: euvolemic  Comments: Blood pressure 131/78, pulse 61, temperature 97.2 °F (36.2 °C), temperature source Temporal, resp. rate 18, height 5' 5\" (1.651 m), weight 213 lb 14.4 oz (97 kg), SpO2 95 %.       Pt stable for discharge from PACU  Multimodal analgesia pain management approach

## 2023-01-31 NOTE — ANESTHESIA PRE PROCEDURE
Department of Anesthesiology  Preprocedure Note       Name:  Joelle Zaldivar   Age:  65 y.o.  :  1960                                          MRN:  [id number]         Date:  2023      Surgeon: Rory Hughes):  Cathi Valencia MD    Procedure: Procedure(s):  EGD ESOPHAGOGASTRODUODENOSCOPY    Medications prior to admission:   Prior to Admission medications    Medication Sig Start Date End Date Taking? Authorizing Provider   EUTHYROX 75 MCG tablet TAKE ONE TABLET BY MOUTH ON MONDAY, WEDNESDAY, THURSDAY, SATURDAY, AND . TAKE 37.5 MCG ON  AND FRIDAY AS DIRECTED 22   Kristina Parson MD   MAGNESIUM CITRATE PO Take by mouth    Ar Automatic Reconciliation   Biotin 10 MG CAPS Take by mouth    Ar Automatic Reconciliation   cyanocobalamin 1000 MCG tablet Take 1,000 mcg by mouth daily    Ar Automatic Reconciliation   escitalopram (LEXAPRO) 10 MG tablet Take 10 mg by mouth daily 22   Ar Automatic Reconciliation       Current medications:    No current facility-administered medications for this encounter. Allergies:  No Known Allergies    Problem List:    Patient Active Problem List   Diagnosis Code    Severe obesity ([facility] [address] E66.01    Esophageal dysphagia R13.19    Nausea and vomiting in adult R11.2    Epigastric abdominal pain R10.13       Past Medical History:        Diagnosis Date    Depression     History of COVID-19 2021    hospitalized, on vent x 5 days    Hypothyroidism        Past Surgical History:        Procedure Laterality Date    CHOLECYSTECTOMY      lap    COLONOSCOPY         Social History:    Social History     Tobacco Use    Smoking status: Never    Smokeless tobacco: Never   Substance Use Topics    Alcohol use:  No                                Counseling given: Not Answered      Vital Signs (Current):   Vitals:    23 0818   Pulse: 64   Resp: 18   Temp: 97.2 °F (36.2 °C)   TempSrc: Temporal   SpO2: 97%   Weight: 213 lb 14.4 oz (97 kg)   Height: 5' 5\" (1.651 m)                                              BP Readings from Last 3 Encounters:   01/25/23 (!) 146/90   01/10/23 (!) 140/80   06/06/22 128/80       NPO Status: Time of last liquid consumption: 2330 (SIP WATER WITH MED)                        Time of last solid consumption: 1900                                                      BMI:   Wt Readings from Last 3 Encounters:   01/31/23 213 lb 14.4 oz (97 kg)   01/25/23 212 lb 3.2 oz (96.3 kg)   01/10/23 182 lb (82.6 kg)     Body mass index is 35.59 kg/m². CBC:   Lab Results   Component Value Date/Time    WBC 8.0 02/22/2022 08:30 AM    RBC 4.80 02/22/2022 08:30 AM    HGB 13.9 02/22/2022 08:30 AM    HCT 43.9 02/22/2022 08:30 AM    MCV 92 02/22/2022 08:30 AM    RDW 13.8 02/22/2022 08:30 AM     02/22/2022 08:30 AM       CMP:   Lab Results   Component Value Date/Time     02/07/2022 09:04 AM    K 5.0 02/07/2022 09:04 AM     02/07/2022 09:04 AM    CO2 23 02/07/2022 09:04 AM    BUN 10 02/07/2022 09:04 AM    CREATININE 0.71 02/07/2022 09:04 AM    GFRAA 107 02/07/2022 09:04 AM    AGRATIO 1.6 03/27/2020 11:26 AM    GLUCOSE 122 02/07/2022 09:04 AM    PROT 7.5 03/27/2020 11:26 AM    CALCIUM 10.2 02/07/2022 09:04 AM    BILITOT 0.5 03/27/2020 11:26 AM    ALKPHOS 67 03/27/2020 11:26 AM    AST 32 03/27/2020 11:26 AM    ALT 23 03/27/2020 11:26 AM       POC Tests: No results for input(s): POCGLU, POCNA, POCK, POCCL, POCBUN, POCHEMO, POCHCT in the last 72 hours.     Coags: No results found for: PROTIME, INR, APTT    HCG (If Applicable): No results found for: PREGTESTUR, PREGSERUM, HCG, HCGQUANT     ABGs: No results found for: PHART, PO2ART, FJV5OXX, SBK8YSA, BEART, M2INQMSM     Type & Screen (If Applicable):  No results found for: LABABO, LABRH    Drug/Infectious Status (If Applicable):  Lab Results   Component Value Date/Time    HEPCAB <0.1 12/21/2018 08:51 AM       COVID-19 Screening (If Applicable): No results found for: COVID19        Anesthesia Evaluation  Patient summary reviewed and Nursing notes reviewed  Airway: Mallampati: II  TM distance: >3 FB   Neck ROM: full  Mouth opening: > = 3 FB   Dental: normal exam         Pulmonary: breath sounds clear to auscultation                            ROS comment: Post-Covid residual SOB (7/21)   Cardiovascular:Negative CV ROS  Exercise tolerance: good (>4 METS),           Rhythm: regular  Rate: normal                    Neuro/Psych:   (+) depression/anxiety             GI/Hepatic/Renal:            ROS comment: Obesity BMI 35. Endo/Other:    (+) hypothyroidism::., .                 Abdominal:             Vascular: negative vascular ROS. Other Findings:           Anesthesia Plan      TIVA     ASA 2       Induction: intravenous. Anesthetic plan and risks discussed with patient.                         Kianna Howard MD   1/31/2023

## 2023-02-01 ENCOUNTER — TELEPHONE (OUTPATIENT)
Dept: GASTROENTEROLOGY | Age: 63
End: 2023-02-01

## 2023-02-07 ENCOUNTER — TELEPHONE (OUTPATIENT)
Dept: GASTROENTEROLOGY | Age: 63
End: 2023-02-07

## 2023-02-07 DIAGNOSIS — K21.9 GASTROESOPHAGEAL REFLUX DISEASE WITHOUT ESOPHAGITIS: Primary | ICD-10-CM

## 2023-02-07 RX ORDER — ESCITALOPRAM OXALATE 10 MG/1
TABLET ORAL
Qty: 30 TABLET | Refills: 0 | Status: SHIPPED | OUTPATIENT
Start: 2023-02-07

## 2023-02-07 RX ORDER — OMEPRAZOLE 20 MG/1
20 CAPSULE, DELAYED RELEASE ORAL
Qty: 90 CAPSULE | Refills: 2 | Status: SHIPPED | OUTPATIENT
Start: 2023-02-07

## 2023-03-07 ENCOUNTER — OFFICE VISIT (OUTPATIENT)
Dept: INTERNAL MEDICINE CLINIC | Facility: CLINIC | Age: 63
End: 2023-03-07
Payer: COMMERCIAL

## 2023-03-07 VITALS
RESPIRATION RATE: 18 BRPM | TEMPERATURE: 97.3 F | HEART RATE: 70 BPM | OXYGEN SATURATION: 96 % | DIASTOLIC BLOOD PRESSURE: 85 MMHG | HEIGHT: 65 IN | BODY MASS INDEX: 35.59 KG/M2 | WEIGHT: 213.6 LBS | SYSTOLIC BLOOD PRESSURE: 138 MMHG

## 2023-03-07 DIAGNOSIS — F33.1 MAJOR DEPRESSIVE DISORDER, RECURRENT, MODERATE (HCC): ICD-10-CM

## 2023-03-07 DIAGNOSIS — E03.9 ACQUIRED HYPOTHYROIDISM: Primary | ICD-10-CM

## 2023-03-07 DIAGNOSIS — E55.9 VITAMIN D DEFICIENCY, UNSPECIFIED: ICD-10-CM

## 2023-03-07 DIAGNOSIS — U09.9 POST-COVID SYNDROME: ICD-10-CM

## 2023-03-07 DIAGNOSIS — R73.09 OTHER ABNORMAL GLUCOSE: ICD-10-CM

## 2023-03-07 LAB
25(OH)D3 SERPL-MCNC: 23.5 NG/ML (ref 30–100)
ALBUMIN SERPL-MCNC: 4 G/DL (ref 3.2–4.6)
ALBUMIN/GLOB SERPL: 1 (ref 0.4–1.6)
ALP SERPL-CCNC: 70 U/L (ref 50–136)
ALT SERPL-CCNC: 35 U/L (ref 12–65)
ANION GAP SERPL CALC-SCNC: 4 MMOL/L (ref 2–11)
AST SERPL-CCNC: 29 U/L (ref 15–37)
BASOPHILS # BLD: 0.1 K/UL (ref 0–0.2)
BASOPHILS NFR BLD: 1 % (ref 0–2)
BILIRUB SERPL-MCNC: 0.9 MG/DL (ref 0.2–1.1)
BUN SERPL-MCNC: 15 MG/DL (ref 8–23)
CALCIUM SERPL-MCNC: 10.1 MG/DL (ref 8.3–10.4)
CHLORIDE SERPL-SCNC: 104 MMOL/L (ref 101–110)
CHOLEST SERPL-MCNC: 237 MG/DL
CO2 SERPL-SCNC: 30 MMOL/L (ref 21–32)
CREAT SERPL-MCNC: 0.7 MG/DL (ref 0.6–1)
DIFFERENTIAL METHOD BLD: NORMAL
EOSINOPHIL # BLD: 0.2 K/UL (ref 0–0.8)
EOSINOPHIL NFR BLD: 2 % (ref 0.5–7.8)
ERYTHROCYTE [DISTWIDTH] IN BLOOD BY AUTOMATED COUNT: 13.6 % (ref 11.9–14.6)
GLOBULIN SER CALC-MCNC: 4.1 G/DL (ref 2.8–4.5)
GLUCOSE SERPL-MCNC: 93 MG/DL (ref 65–100)
HCT VFR BLD AUTO: 45.6 % (ref 35.8–46.3)
HDLC SERPL-MCNC: 51 MG/DL (ref 40–60)
HDLC SERPL: 4.6
HGB BLD-MCNC: 14.6 G/DL (ref 11.7–15.4)
IMM GRANULOCYTES # BLD AUTO: 0 K/UL (ref 0–0.5)
IMM GRANULOCYTES NFR BLD AUTO: 0 % (ref 0–5)
LDLC SERPL CALC-MCNC: 157.2 MG/DL
LYMPHOCYTES # BLD: 3.1 K/UL (ref 0.5–4.6)
LYMPHOCYTES NFR BLD: 39 % (ref 13–44)
MCH RBC QN AUTO: 29.5 PG (ref 26.1–32.9)
MCHC RBC AUTO-ENTMCNC: 32 G/DL (ref 31.4–35)
MCV RBC AUTO: 92.1 FL (ref 82–102)
MONOCYTES # BLD: 0.8 K/UL (ref 0.1–1.3)
MONOCYTES NFR BLD: 11 % (ref 4–12)
NEUTS SEG # BLD: 3.8 K/UL (ref 1.7–8.2)
NEUTS SEG NFR BLD: 47 % (ref 43–78)
NRBC # BLD: 0 K/UL (ref 0–0.2)
PLATELET # BLD AUTO: 273 K/UL (ref 150–450)
PMV BLD AUTO: 10.7 FL (ref 9.4–12.3)
POTASSIUM SERPL-SCNC: 4.8 MMOL/L (ref 3.5–5.1)
PROT SERPL-MCNC: 8.1 G/DL (ref 6.3–8.2)
RBC # BLD AUTO: 4.95 M/UL (ref 4.05–5.2)
SODIUM SERPL-SCNC: 138 MMOL/L (ref 133–143)
TRIGL SERPL-MCNC: 144 MG/DL (ref 35–150)
TSH W FREE THYROID IF ABNORMAL: 1.59 UIU/ML (ref 0.36–3.74)
VLDLC SERPL CALC-MCNC: 28.8 MG/DL (ref 6–23)
WBC # BLD AUTO: 8 K/UL (ref 4.3–11.1)

## 2023-03-07 PROCEDURE — 99214 OFFICE O/P EST MOD 30 MIN: CPT | Performed by: INTERNAL MEDICINE

## 2023-03-07 RX ORDER — LEVOTHYROXINE SODIUM 0.07 MG/1
TABLET ORAL
Qty: 90 TABLET | Refills: 3 | Status: SHIPPED | OUTPATIENT
Start: 2023-03-07

## 2023-03-07 RX ORDER — ESCITALOPRAM OXALATE 10 MG/1
TABLET ORAL
Qty: 90 TABLET | Refills: 3 | Status: SHIPPED | OUTPATIENT
Start: 2023-03-07

## 2023-03-07 SDOH — ECONOMIC STABILITY: FOOD INSECURITY: WITHIN THE PAST 12 MONTHS, YOU WORRIED THAT YOUR FOOD WOULD RUN OUT BEFORE YOU GOT MONEY TO BUY MORE.: PATIENT DECLINED

## 2023-03-07 SDOH — ECONOMIC STABILITY: FOOD INSECURITY: WITHIN THE PAST 12 MONTHS, THE FOOD YOU BOUGHT JUST DIDN'T LAST AND YOU DIDN'T HAVE MONEY TO GET MORE.: PATIENT DECLINED

## 2023-03-07 SDOH — ECONOMIC STABILITY: INCOME INSECURITY: HOW HARD IS IT FOR YOU TO PAY FOR THE VERY BASICS LIKE FOOD, HOUSING, MEDICAL CARE, AND HEATING?: PATIENT DECLINED

## 2023-03-07 SDOH — ECONOMIC STABILITY: TRANSPORTATION INSECURITY
IN THE PAST 12 MONTHS, HAS LACK OF TRANSPORTATION KEPT YOU FROM MEETINGS, WORK, OR FROM GETTING THINGS NEEDED FOR DAILY LIVING?: PATIENT DECLINED

## 2023-03-07 SDOH — ECONOMIC STABILITY: HOUSING INSECURITY
IN THE LAST 12 MONTHS, WAS THERE A TIME WHEN YOU DID NOT HAVE A STEADY PLACE TO SLEEP OR SLEPT IN A SHELTER (INCLUDING NOW)?: PATIENT REFUSED

## 2023-03-07 ASSESSMENT — PATIENT HEALTH QUESTIONNAIRE - PHQ9
1. LITTLE INTEREST OR PLEASURE IN DOING THINGS: 0
4. FEELING TIRED OR HAVING LITTLE ENERGY: 1
9. THOUGHTS THAT YOU WOULD BE BETTER OFF DEAD, OR OF HURTING YOURSELF: 0
2. FEELING DOWN, DEPRESSED OR HOPELESS: 0
5. POOR APPETITE OR OVEREATING: 1
3. TROUBLE FALLING OR STAYING ASLEEP: 0
7. TROUBLE CONCENTRATING ON THINGS, SUCH AS READING THE NEWSPAPER OR WATCHING TELEVISION: 0
8. MOVING OR SPEAKING SO SLOWLY THAT OTHER PEOPLE COULD HAVE NOTICED. OR THE OPPOSITE, BEING SO FIGETY OR RESTLESS THAT YOU HAVE BEEN MOVING AROUND A LOT MORE THAN USUAL: 0
SUM OF ALL RESPONSES TO PHQ QUESTIONS 1-9: 2
10. IF YOU CHECKED OFF ANY PROBLEMS, HOW DIFFICULT HAVE THESE PROBLEMS MADE IT FOR YOU TO DO YOUR WORK, TAKE CARE OF THINGS AT HOME, OR GET ALONG WITH OTHER PEOPLE: 0
SUM OF ALL RESPONSES TO PHQ9 QUESTIONS 1 & 2: 0
SUM OF ALL RESPONSES TO PHQ QUESTIONS 1-9: 2
6. FEELING BAD ABOUT YOURSELF - OR THAT YOU ARE A FAILURE OR HAVE LET YOURSELF OR YOUR FAMILY DOWN: 0

## 2023-03-07 NOTE — PROGRESS NOTES
03/07/2023   Location:[facility] [address] INTERNAL MEDICINE  [place]  Patient #:  [id number]  YOB: 1960        History of Present Illness     Chief Complaint   Patient presents with    6 Month Follow-Up     4-6 month fasting     Hypothyroidism    Depression       Ms. Otoole is a 65 y.o. female  who presents for Follow up on chronic medical issues. There is compliance and tolerance with medications. Chronic active medical issues assessed today include  Hypothyroidism, anxiety/depression, pre diabetes, hyperlipidemia. Mayra Lopez compliant with synthroid taking on an empty stomach. She compliant with Lexapro for anxiety and depression. \had episode of epigastric pain with vomiting. Saw GI Had EGD. Bx and stretched esophagus.   Has been taking allegra    Last Labs      No Known Allergies  Past Medical History:   Diagnosis Date    Depression     History of COVID-19 07/2021    hospitalized, on vent x 5 days    Hypothyroidism      Social History     Socioeconomic History    Marital status:      Spouse name: None    Number of children: None    Years of education: None    Highest education level: None   Tobacco Use    Smoking status: Never    Smokeless tobacco: Never   Substance and Sexual Activity    Alcohol use: No    Drug use: No     Past Surgical History:   Procedure Laterality Date    CHOLECYSTECTOMY  1990    lap    COLONOSCOPY      UPPER GASTROINTESTINAL ENDOSCOPY N/A 1/31/2023    EGD BIOPSY/BALLOON DILATION performed by Robinson Gutierrez MD at [address] [place] [address]     Family History   Problem Relation Age of Onset    Depression Mother     Heart Attack Father     Diabetes Mother     Hypertension Mother      Current Outpatient Medications   Medication Sig Dispense Refill    escitalopram (LEXAPRO) 10 MG tablet Take 1 tablet by mouth once daily 30 tablet 0    omeprazole (PRILOSEC) 20 MG delayed release capsule Take 1 capsule by mouth every morning (before breakfast) 90 capsule 2    EUTHYROX 75 MCG tablet TAKE ONE TABLET BY MOUTH ON MONDAY, WEDNESDAY, THURSDAY, SATURDAY, AND SUNDAY. TAKE 37.5 MCG ON TUESDAYS AND FRIDAY AS DIRECTED 90 tablet 3    MAGNESIUM CITRATE PO Take by mouth      Biotin 10 MG CAPS Take by mouth      cyanocobalamin 1000 MCG tablet Take 1,000 mcg by mouth daily (Patient not taking: Reported on 3/7/2023)       No current facility-administered medications for this visit. Health Maintenance   Topic Date Due    COVID-19 Vaccine (1) Never done    HIV screen  Never done    DTaP/Tdap/Td vaccine (1 - Tdap) Never done    Cervical cancer screen  Never done    Colorectal Cancer Screen  Never done    Shingles vaccine (1 of 2) Never done    Flu vaccine (1) Never done    A1C test (Diabetic or Prediabetic)  02/22/2023    Depression Monitoring  01/25/2024    Breast cancer screen  03/31/2024    Lipids  02/22/2027    Hepatitis C screen  Completed    Hepatitis A vaccine  Aged Out    Hib vaccine  Aged Out    Meningococcal (ACWY) vaccine  Aged Out    Pneumococcal 0-64 years Vaccine  Aged Out             Review of Systems  Review of Systems   Constitutional:  Positive for fatigue. Negative for fever. Eyes:  Negative for visual disturbance. Respiratory:  Negative for cough and shortness of breath. Cardiovascular:  Negative for chest pain. Gastrointestinal:  Negative for abdominal pain. Genitourinary:  Negative for difficulty urinating. Psychiatric/Behavioral:  Positive for decreased concentration and dysphoric mood. /85 (Site: Left Upper Arm, Position: Sitting, Cuff Size: Large Adult)   Pulse 70   Temp 97.3 °F (36.3 °C) (Temporal)   Resp 18   Ht 5' 5\" (1.651 m)   Wt 213 lb 9.6 oz (96.9 kg)   SpO2 96% Comment: ra  BMI 35.54 kg/m²     Wt Readings from Last 3 Encounters:   03/07/23 213 lb 9.6 oz (96.9 kg)   01/31/23 213 lb 14.4 oz (97 kg)   01/25/23 212 lb 3.2 oz (96.3 kg)       Physical Exam    Physical Exam  Vitals and nursing note reviewed.    Constitutional:       General: She is not in acute distress. Appearance: Normal appearance. She is not ill-appearing. HENT:      Head: Normocephalic and atraumatic. Eyes:      Extraocular Movements: Extraocular movements intact. Cardiovascular:      Rate and Rhythm: Normal rate and regular rhythm. Pulmonary:      Effort: Pulmonary effort is normal.      Breath sounds: Normal breath sounds. No wheezing. Abdominal:      General: Bowel sounds are normal.   Skin:     General: Skin is warm and dry. Neurological:      General: No focal deficit present. Mental Status: She is alert. Psychiatric:         Mood and Affect: Mood normal.           Assessment & Plan    Encounter Diagnoses   Name Primary? Acquired hypothyroidism Yes    Vitamin D deficiency, unspecified     Other abnormal glucose     Major depressive disorder, recurrent, moderate     Post-COVID syndrome        Orders Placed This Encounter   Medications    escitalopram (LEXAPRO) 10 MG tablet     Sig: Take 1 tablet by mouth once daily     Dispense:  90 tablet     Refill:  3    levothyroxine (EUTHYROX) 75 MCG tablet     Sig: TAKE ONE TABLET BY MOUTH ON MONDAY, WEDNESDAY, THURSDAY, SATURDAY, AND SUNDAY.  TAKE 37.5 MCG ON TUESDAYS AND FRIDAY AS DIRECTED     Dispense:  90 tablet     Refill:  3       Orders Placed This Encounter   Procedures    Comprehensive Metabolic Panel     Standing Status:   Future     Number of Occurrences:   1     Standing Expiration Date:   3/7/2024    CBC with Auto Differential     Standing Status:   Future     Number of Occurrences:   1     Standing Expiration Date:   3/7/2024    Lipid Panel     Standing Status:   Future     Number of Occurrences:   1     Standing Expiration Date:   3/7/2024    TSH with Reflex     Standing Status:   Future     Number of Occurrences:   1     Standing Expiration Date:   3/7/2024    Vitamin D 25 Hydroxy     Standing Status:   Future     Number of Occurrences:   1     Standing Expiration Date:   3/7/2024     The patient is asked to make an attempt to improve diet and exercise patterns to aid in medical management of this problem. Avoid sweet/sugary foods and beverages. Limit carbohydrates  in your diet. Carbohydrates like bread, pasta, rice and white potatoes are broken down by the body into glucose which is sugar. Sugar is a source of energy. So the more active you are the more sugar is burned off. Aim for 150 minutes of aerobic exercise over the course of a week. Walking is great exercise. Recommend a low fat high fiber diet and regular exercise to help lower your cholesterol. Limit fried foods, red meats and animal fats. Eat more whole grains, fruits and vegetables. Chronic medical issues are stable. No change in medications. Return in about 6 months (around 9/7/2023) for preventative examination, and as needed for new or worsening problems.         Corwin Horan MD

## 2023-03-13 ENCOUNTER — TELEPHONE (OUTPATIENT)
Dept: INTERNAL MEDICINE CLINIC | Facility: CLINIC | Age: 63
End: 2023-03-13

## 2023-03-13 NOTE — TELEPHONE ENCOUNTER
----- Message from Corwin Horan MD sent at 3/11/2023  7:40 PM EST -----  Please call and notify patient:   Recent labs were reviewed. Glucose/Blood sugar was normal.  Kidney function, liver function and thyroid function  were normal.continue the same with thyroid medication. Vitmain D level is low. Recommend vitamin D 3000 International units   daily. Cholesterol is higher. Recommend a low fat high fiber diet and regular exercise to help lower your cholesterol. Limit fried foods, red meats and animal fats. Eat more whole grains, fruits and vegetables. Will repeat at foloow up. A [employer] message with this note was also sent to the patient.

## 2023-03-15 ASSESSMENT — ENCOUNTER SYMPTOMS
COUGH: 0
ABDOMINAL PAIN: 0
SHORTNESS OF BREATH: 0

## 2023-05-30 ENCOUNTER — OFFICE VISIT (OUTPATIENT)
Dept: GASTROENTEROLOGY | Age: 63
End: 2023-05-30
Payer: COMMERCIAL

## 2023-05-30 VITALS
OXYGEN SATURATION: 92 % | SYSTOLIC BLOOD PRESSURE: 144 MMHG | RESPIRATION RATE: 18 BRPM | HEART RATE: 68 BPM | TEMPERATURE: 97.6 F | DIASTOLIC BLOOD PRESSURE: 84 MMHG | WEIGHT: 222.3 LBS | BODY MASS INDEX: 37.04 KG/M2 | HEIGHT: 65 IN

## 2023-05-30 DIAGNOSIS — R13.19 ESOPHAGEAL DYSPHAGIA: ICD-10-CM

## 2023-05-30 DIAGNOSIS — K22.2 BENIGN ESOPHAGEAL STRICTURE: ICD-10-CM

## 2023-05-30 DIAGNOSIS — K21.9 GERD WITHOUT ESOPHAGITIS: Primary | ICD-10-CM

## 2023-05-30 PROCEDURE — 99213 OFFICE O/P EST LOW 20 MIN: CPT | Performed by: INTERNAL MEDICINE

## 2023-05-30 ASSESSMENT — ENCOUNTER SYMPTOMS
CHOKING: 1
COUGH: 1

## 2023-05-30 NOTE — PROGRESS NOTES
Олег Mayen (:  1960) is a 59 y.o. female,established patient here for evaluation of the following chief complaint(s):epigastric abd pain, vomiting  Follow-up         ASSESSMENT/PLAN:  1. GERD without esophagitis  2. Esophageal dysphagia  3. Benign esophageal stricture    Continue proton catheter. He was advised to chew her food very well and swallow slowly. Patient has clinical symptoms of esophageal dyskinesia. We discussed esophageal manometry she declined. She is to call back if her symptoms get worse       Subjective   SUBJECTIVE/OBJECTIVE  Patient presents with with episodes of difficulty swallowing. She started eating or taking her medications and only she feels that she cannot pass the food bolus to her stomach but within another minute she feels fine. She might be having intermittent episode of dyskinesia could have been related to her prior COVID infection and diaphragm injury. She she coughs a lot of mucus ,coming from her airways. CT scan showed recovering post-COVID pulmonary air-trapping. EGD 2023; esophageal ring/gastritis. Review of Systems   Respiratory:  Positive for cough and choking. Objective     Physical Exam  HENT:      Head: Normocephalic. Mouth/Throat:      Mouth: Mucous membranes are moist.   Eyes:      General: No scleral icterus. Cardiovascular:      Rate and Rhythm: Normal rate and regular rhythm. Pulmonary:      Effort: No respiratory distress. Abdominal:      General: There is no distension. Tenderness: There is no abdominal tenderness. There is no rebound. Lymphadenopathy:      Cervical: No cervical adenopathy. Skin:     Coloration: Skin is not jaundiced. Findings: No bruising. Neurological:      General: No focal deficit present. Mental Status: She is alert. No follow-ups on file. An electronic signature was used to authenticate this note.     --Jerry Smith MD

## 2023-06-09 ENCOUNTER — TELEPHONE (OUTPATIENT)
Dept: INTERNAL MEDICINE CLINIC | Facility: CLINIC | Age: 63
End: 2023-06-09

## 2023-06-09 NOTE — TELEPHONE ENCOUNTER
Patient states she has broken her foot and pulled ligaments and is in a boot. Her orthopedics doctor is out of town until June 19 and she is needing a handicap sticker. The orthopedic doctor advised her to call her PCP to see if she could get one.  Patient would like a call back

## 2023-10-05 ASSESSMENT — PATIENT HEALTH QUESTIONNAIRE - PHQ9
SUM OF ALL RESPONSES TO PHQ QUESTIONS 1-9: 7
SUM OF ALL RESPONSES TO PHQ QUESTIONS 1-9: 7
10. IF YOU CHECKED OFF ANY PROBLEMS, HOW DIFFICULT HAVE THESE PROBLEMS MADE IT FOR YOU TO DO YOUR WORK, TAKE CARE OF THINGS AT HOME, OR GET ALONG WITH OTHER PEOPLE: SOMEWHAT DIFFICULT
3. TROUBLE FALLING OR STAYING ASLEEP: NOT AT ALL
1. LITTLE INTEREST OR PLEASURE IN DOING THINGS: 0
6. FEELING BAD ABOUT YOURSELF - OR THAT YOU ARE A FAILURE OR HAVE LET YOURSELF OR YOUR FAMILY DOWN: NOT AT ALL
3. TROUBLE FALLING OR STAYING ASLEEP: 0
1. LITTLE INTEREST OR PLEASURE IN DOING THINGS: NOT AT ALL
SUM OF ALL RESPONSES TO PHQ9 QUESTIONS 1 & 2: 0
10. IF YOU CHECKED OFF ANY PROBLEMS, HOW DIFFICULT HAVE THESE PROBLEMS MADE IT FOR YOU TO DO YOUR WORK, TAKE CARE OF THINGS AT HOME, OR GET ALONG WITH OTHER PEOPLE: 1
9. THOUGHTS THAT YOU WOULD BE BETTER OFF DEAD, OR OF HURTING YOURSELF: NOT AT ALL
8. MOVING OR SPEAKING SO SLOWLY THAT OTHER PEOPLE COULD HAVE NOTICED. OR THE OPPOSITE, BEING SO FIGETY OR RESTLESS THAT YOU HAVE BEEN MOVING AROUND A LOT MORE THAN USUAL: 0
8. MOVING OR SPEAKING SO SLOWLY THAT OTHER PEOPLE COULD HAVE NOTICED. OR THE OPPOSITE - BEING SO FIDGETY OR RESTLESS THAT YOU HAVE BEEN MOVING AROUND A LOT MORE THAN USUAL: NOT AT ALL
SUM OF ALL RESPONSES TO PHQ QUESTIONS 1-9: 7
SUM OF ALL RESPONSES TO PHQ QUESTIONS 1-9: 7
9. THOUGHTS THAT YOU WOULD BE BETTER OFF DEAD, OR OF HURTING YOURSELF: 0
2. FEELING DOWN, DEPRESSED OR HOPELESS: NOT AT ALL
5. POOR APPETITE OR OVEREATING: MORE THAN HALF THE DAYS
4. FEELING TIRED OR HAVING LITTLE ENERGY: NEARLY EVERY DAY
SUM OF ALL RESPONSES TO PHQ QUESTIONS 1-9: 7
2. FEELING DOWN, DEPRESSED OR HOPELESS: 0
7. TROUBLE CONCENTRATING ON THINGS, SUCH AS READING THE NEWSPAPER OR WATCHING TELEVISION: 2
7. TROUBLE CONCENTRATING ON THINGS, SUCH AS READING THE NEWSPAPER OR WATCHING TELEVISION: MORE THAN HALF THE DAYS
5. POOR APPETITE OR OVEREATING: 2
6. FEELING BAD ABOUT YOURSELF - OR THAT YOU ARE A FAILURE OR HAVE LET YOURSELF OR YOUR FAMILY DOWN: 0
4. FEELING TIRED OR HAVING LITTLE ENERGY: 3

## 2023-10-06 ENCOUNTER — OFFICE VISIT (OUTPATIENT)
Dept: INTERNAL MEDICINE CLINIC | Facility: CLINIC | Age: 63
End: 2023-10-06
Payer: COMMERCIAL

## 2023-10-06 VITALS
OXYGEN SATURATION: 96 % | TEMPERATURE: 97.1 F | HEIGHT: 65 IN | SYSTOLIC BLOOD PRESSURE: 132 MMHG | DIASTOLIC BLOOD PRESSURE: 81 MMHG | BODY MASS INDEX: 37.15 KG/M2 | HEART RATE: 61 BPM | WEIGHT: 223 LBS

## 2023-10-06 DIAGNOSIS — I83.812 VARICOSE VEINS OF LEFT LOWER EXTREMITY WITH PAIN: ICD-10-CM

## 2023-10-06 DIAGNOSIS — R30.0 DYSURIA: ICD-10-CM

## 2023-10-06 DIAGNOSIS — F33.1 MAJOR DEPRESSIVE DISORDER, RECURRENT, MODERATE (HCC): ICD-10-CM

## 2023-10-06 DIAGNOSIS — Z12.4 CERVICAL CANCER SCREENING: ICD-10-CM

## 2023-10-06 DIAGNOSIS — E03.9 ACQUIRED HYPOTHYROIDISM: ICD-10-CM

## 2023-10-06 DIAGNOSIS — Z00.00 ENCOUNTER FOR PREVENTATIVE ADULT HEALTH CARE EXAMINATION: Primary | ICD-10-CM

## 2023-10-06 LAB
ALBUMIN SERPL-MCNC: 4.2 G/DL (ref 3.2–4.6)
ALBUMIN/GLOB SERPL: 1.1 (ref 0.4–1.6)
ALP SERPL-CCNC: 68 U/L (ref 50–136)
ALT SERPL-CCNC: 48 U/L (ref 12–65)
ANION GAP SERPL CALC-SCNC: 6 MMOL/L (ref 2–11)
APPEARANCE UR: CLEAR
AST SERPL-CCNC: 38 U/L (ref 15–37)
BACTERIA URNS QL MICRO: ABNORMAL /HPF
BASOPHILS # BLD: 0.1 K/UL (ref 0–0.2)
BASOPHILS NFR BLD: 1 % (ref 0–2)
BILIRUB SERPL-MCNC: 0.5 MG/DL (ref 0.2–1.1)
BILIRUB UR QL: NEGATIVE
BUN SERPL-MCNC: 11 MG/DL (ref 8–23)
CALCIUM SERPL-MCNC: 9.9 MG/DL (ref 8.3–10.4)
CHLORIDE SERPL-SCNC: 106 MMOL/L (ref 101–110)
CHOLEST SERPL-MCNC: 215 MG/DL
CO2 SERPL-SCNC: 27 MMOL/L (ref 21–32)
COLOR UR: ABNORMAL
CREAT SERPL-MCNC: 0.8 MG/DL (ref 0.6–1)
DIFFERENTIAL METHOD BLD: ABNORMAL
EOSINOPHIL # BLD: 0.1 K/UL (ref 0–0.8)
EOSINOPHIL NFR BLD: 2 % (ref 0.5–7.8)
ERYTHROCYTE [DISTWIDTH] IN BLOOD BY AUTOMATED COUNT: 14.2 % (ref 11.9–14.6)
GLOBULIN SER CALC-MCNC: 3.8 G/DL (ref 2.8–4.5)
GLUCOSE SERPL-MCNC: 98 MG/DL (ref 65–100)
GLUCOSE UR STRIP.AUTO-MCNC: NEGATIVE MG/DL
HCT VFR BLD AUTO: 43.8 % (ref 35.8–46.3)
HDLC SERPL-MCNC: 48 MG/DL (ref 40–60)
HDLC SERPL: 4.5
HGB BLD-MCNC: 14.2 G/DL (ref 11.7–15.4)
HGB UR QL STRIP: NEGATIVE
IMM GRANULOCYTES # BLD AUTO: 0 K/UL (ref 0–0.5)
IMM GRANULOCYTES NFR BLD AUTO: 0 % (ref 0–5)
KETONES UR QL STRIP.AUTO: NEGATIVE MG/DL
LDLC SERPL CALC-MCNC: 137.6 MG/DL
LEUKOCYTE ESTERASE UR QL STRIP.AUTO: ABNORMAL
LYMPHOCYTES # BLD: 3.4 K/UL (ref 0.5–4.6)
LYMPHOCYTES NFR BLD: 49 % (ref 13–44)
MCH RBC QN AUTO: 30.1 PG (ref 26.1–32.9)
MCHC RBC AUTO-ENTMCNC: 32.4 G/DL (ref 31.4–35)
MCV RBC AUTO: 93 FL (ref 82–102)
MONOCYTES # BLD: 0.8 K/UL (ref 0.1–1.3)
MONOCYTES NFR BLD: 11 % (ref 4–12)
NEUTS SEG # BLD: 2.6 K/UL (ref 1.7–8.2)
NEUTS SEG NFR BLD: 37 % (ref 43–78)
NITRITE UR QL STRIP.AUTO: NEGATIVE
NRBC # BLD: 0 K/UL (ref 0–0.2)
OTHER OBSERVATIONS: ABNORMAL
PH UR STRIP: 6 (ref 5–9)
PLATELET # BLD AUTO: 242 K/UL (ref 150–450)
PMV BLD AUTO: 10.8 FL (ref 9.4–12.3)
POTASSIUM SERPL-SCNC: 4.5 MMOL/L (ref 3.5–5.1)
PROT SERPL-MCNC: 8 G/DL (ref 6.3–8.2)
PROT UR STRIP-MCNC: NEGATIVE MG/DL
RBC # BLD AUTO: 4.71 M/UL (ref 4.05–5.2)
SODIUM SERPL-SCNC: 139 MMOL/L (ref 133–143)
SP GR UR REFRACTOMETRY: 1.02 (ref 1–1.02)
TRIGL SERPL-MCNC: 147 MG/DL (ref 35–150)
TSH W FREE THYROID IF ABNORMAL: 4.89 UIU/ML (ref 0.36–3.74)
UROBILINOGEN UR QL STRIP.AUTO: 0.2 EU/DL (ref 0.2–1)
VLDLC SERPL CALC-MCNC: 29.4 MG/DL (ref 6–23)
WBC # BLD AUTO: 7 K/UL (ref 4.3–11.1)
WBC URNS QL MICRO: ABNORMAL /HPF

## 2023-10-06 PROCEDURE — 99396 PREV VISIT EST AGE 40-64: CPT | Performed by: INTERNAL MEDICINE

## 2023-10-06 RX ORDER — VITAMIN B COMPLEX
1 CAPSULE ORAL DAILY
COMMUNITY

## 2023-10-06 NOTE — PROGRESS NOTES
10/06/2023   Location:[address] [facility]NAL [facility]ICINE  [place]  Patient #:  [id number]  YOB: 1960        History of Present Illness     Chief Complaint   Patient presents with    Annual Exam     Pt presents to the office for their annual physical.       Ms. Earl Herbert is a 59 y.o. female  who presents for yearly preventative examination and Follow up on chronic medical issues. There is compliance and tolerance with medications. Chronic active medical issues assessed today include  Hypothyroidism, anxiety/depression, pre diabetes, hyperlipidemia. Bhavesh Flies compliant with synthroid taking on an empty stomach. She compliant with Lexapro for anxiety and depression  Sometimes ears hurt since COVID. Still has HA comes and goes feels tired. Increase varicose veins and pain.     Last Labs  CBC:   Lab Results   Component Value Date/Time    WBC 7.0 10/06/2023 11:35 AM    RBC 4.71 10/06/2023 11:35 AM    HGB 14.2 10/06/2023 11:35 AM    HCT 43.8 10/06/2023 11:35 AM    MCV 93.0 10/06/2023 11:35 AM    MCH 30.1 10/06/2023 11:35 AM    MCHC 32.4 10/06/2023 11:35 AM    RDW 14.2 10/06/2023 11:35 AM     10/06/2023 11:35 AM    MPV 10.8 10/06/2023 11:35 AM     CMP:    Lab Results   Component Value Date/Time     10/06/2023 11:35 AM    K 4.5 10/06/2023 11:35 AM     10/06/2023 11:35 AM    CO2 27 10/06/2023 11:35 AM    BUN 11 10/06/2023 11:35 AM    CREATININE 0.80 10/06/2023 11:35 AM    GFRAA 107 02/07/2022 09:04 AM    AGRATIO 1.6 03/27/2020 11:26 AM    LABGLOM >60 10/06/2023 11:35 AM    GLUCOSE 98 10/06/2023 11:35 AM    PROT 8.0 10/06/2023 11:35 AM    LABALBU 4.2 10/06/2023 11:35 AM    CALCIUM 9.9 10/06/2023 11:35 AM    BILITOT 0.5 10/06/2023 11:35 AM    ALKPHOS 68 10/06/2023 11:35 AM    ALKPHOS 67 03/27/2020 11:26 AM    AST 38 10/06/2023 11:35 AM    ALT 48 10/06/2023 11:35 AM     HgBA1c:    Lab Results   Component Value Date/Time    LABA1C 5.9 02/22/2022 08:30 AM     FLP:    Lab Results

## 2023-10-07 LAB — T4 FREE SERPL-MCNC: 1 NG/DL (ref 0.78–1.46)

## 2023-10-09 LAB
BACTERIA SPEC CULT: NORMAL
BACTERIA SPEC CULT: NORMAL
SERVICE CMNT-IMP: NORMAL

## 2023-10-13 ENCOUNTER — TELEPHONE (OUTPATIENT)
Dept: INTERNAL MEDICINE CLINIC | Facility: CLINIC | Age: 63
End: 2023-10-13

## 2023-10-13 ENCOUNTER — NURSE ONLY (OUTPATIENT)
Dept: INTERNAL MEDICINE CLINIC | Facility: CLINIC | Age: 63
End: 2023-10-13

## 2023-10-13 DIAGNOSIS — R30.0 DYSURIA: Primary | ICD-10-CM

## 2023-10-13 DIAGNOSIS — R30.0 DYSURIA: ICD-10-CM

## 2023-10-13 LAB
BACTERIA URNS QL MICRO: NEGATIVE /HPF
CASTS URNS QL MICRO: NORMAL /LPF (ref 0–2)
EPI CELLS #/AREA URNS HPF: NORMAL /HPF (ref 0–5)
RBC #/AREA URNS HPF: NORMAL /HPF (ref 0–5)
WBC URNS QL MICRO: NORMAL /HPF (ref 0–4)

## 2023-10-13 NOTE — TELEPHONE ENCOUNTER
Pt would like to follow up on urine culture results. Culture was contaminated and pt is still experiencing symptoms.

## 2023-10-15 LAB
BACTERIA SPEC CULT: NORMAL
SERVICE CMNT-IMP: NORMAL

## 2023-10-16 LAB
BACTERIA SPEC CULT: NORMAL
SERVICE CMNT-IMP: NORMAL

## 2024-01-16 RX ORDER — ESCITALOPRAM OXALATE 10 MG/1
TABLET ORAL
Qty: 90 TABLET | Refills: 3 | Status: SHIPPED | OUTPATIENT
Start: 2024-01-16

## 2024-04-09 ENCOUNTER — OFFICE VISIT (OUTPATIENT)
Dept: INTERNAL MEDICINE CLINIC | Facility: CLINIC | Age: 64
End: 2024-04-09
Payer: COMMERCIAL

## 2024-04-09 VITALS
WEIGHT: 225 LBS | DIASTOLIC BLOOD PRESSURE: 80 MMHG | HEIGHT: 65 IN | SYSTOLIC BLOOD PRESSURE: 126 MMHG | OXYGEN SATURATION: 92 % | HEART RATE: 70 BPM | BODY MASS INDEX: 37.49 KG/M2 | TEMPERATURE: 97.3 F

## 2024-04-09 DIAGNOSIS — E55.9 VITAMIN D DEFICIENCY, UNSPECIFIED: ICD-10-CM

## 2024-04-09 DIAGNOSIS — K21.9 GASTROESOPHAGEAL REFLUX DISEASE WITHOUT ESOPHAGITIS: ICD-10-CM

## 2024-04-09 DIAGNOSIS — J84.9 ILD (INTERSTITIAL LUNG DISEASE) (HCC): ICD-10-CM

## 2024-04-09 DIAGNOSIS — E03.9 ACQUIRED HYPOTHYROIDISM: ICD-10-CM

## 2024-04-09 DIAGNOSIS — F33.1 MAJOR DEPRESSIVE DISORDER, RECURRENT, MODERATE (HCC): Primary | ICD-10-CM

## 2024-04-09 DIAGNOSIS — E66.01 SEVERE OBESITY (HCC): ICD-10-CM

## 2024-04-09 LAB
ALBUMIN SERPL-MCNC: 4.1 G/DL (ref 3.2–4.6)
ALBUMIN/GLOB SERPL: 1.1 (ref 0.4–1.6)
ALP SERPL-CCNC: 66 U/L (ref 50–136)
ALT SERPL-CCNC: 51 U/L (ref 12–65)
ANION GAP SERPL CALC-SCNC: 6 MMOL/L (ref 2–11)
AST SERPL-CCNC: 39 U/L (ref 15–37)
BILIRUB SERPL-MCNC: 0.4 MG/DL (ref 0.2–1.1)
BUN SERPL-MCNC: 15 MG/DL (ref 8–23)
CALCIUM SERPL-MCNC: 10.3 MG/DL (ref 8.3–10.4)
CHLORIDE SERPL-SCNC: 106 MMOL/L (ref 103–113)
CHOLEST SERPL-MCNC: 230 MG/DL
CO2 SERPL-SCNC: 28 MMOL/L (ref 21–32)
CREAT SERPL-MCNC: 0.6 MG/DL (ref 0.6–1)
GLOBULIN SER CALC-MCNC: 3.8 G/DL (ref 2.8–4.5)
GLUCOSE SERPL-MCNC: 116 MG/DL (ref 65–100)
HDLC SERPL-MCNC: 50 MG/DL (ref 40–60)
HDLC SERPL: 4.6
LDLC SERPL CALC-MCNC: 153 MG/DL
MAGNESIUM SERPL-MCNC: 2 MG/DL (ref 1.8–2.4)
POTASSIUM SERPL-SCNC: 4.7 MMOL/L (ref 3.5–5.1)
PROT SERPL-MCNC: 7.9 G/DL (ref 6.3–8.2)
SODIUM SERPL-SCNC: 140 MMOL/L (ref 136–146)
TRIGL SERPL-MCNC: 135 MG/DL (ref 35–150)
TSH W FREE THYROID IF ABNORMAL: 2.22 UIU/ML (ref 0.36–3.74)
VLDLC SERPL CALC-MCNC: 27 MG/DL (ref 6–23)

## 2024-04-09 PROCEDURE — 99214 OFFICE O/P EST MOD 30 MIN: CPT | Performed by: INTERNAL MEDICINE

## 2024-04-09 RX ORDER — OMEPRAZOLE 20 MG/1
20 CAPSULE, DELAYED RELEASE ORAL
Qty: 90 CAPSULE | Refills: 3 | Status: SHIPPED | OUTPATIENT
Start: 2024-04-09

## 2024-04-09 RX ORDER — LEVOTHYROXINE SODIUM 0.07 MG/1
TABLET ORAL
Qty: 90 TABLET | Refills: 3 | Status: CANCELLED | OUTPATIENT
Start: 2024-04-09

## 2024-04-09 SDOH — ECONOMIC STABILITY: INCOME INSECURITY: HOW HARD IS IT FOR YOU TO PAY FOR THE VERY BASICS LIKE FOOD, HOUSING, MEDICAL CARE, AND HEATING?: PATIENT DECLINED

## 2024-04-09 SDOH — ECONOMIC STABILITY: FOOD INSECURITY: WITHIN THE PAST 12 MONTHS, YOU WORRIED THAT YOUR FOOD WOULD RUN OUT BEFORE YOU GOT MONEY TO BUY MORE.: NEVER TRUE

## 2024-04-09 SDOH — ECONOMIC STABILITY: HOUSING INSECURITY
IN THE LAST 12 MONTHS, WAS THERE A TIME WHEN YOU DID NOT HAVE A STEADY PLACE TO SLEEP OR SLEPT IN A SHELTER (INCLUDING NOW)?: NO

## 2024-04-09 SDOH — ECONOMIC STABILITY: FOOD INSECURITY: WITHIN THE PAST 12 MONTHS, THE FOOD YOU BOUGHT JUST DIDN'T LAST AND YOU DIDN'T HAVE MONEY TO GET MORE.: NEVER TRUE

## 2024-04-09 NOTE — PROGRESS NOTES
04/09/2024   Location:[facility] PHYSICIAN SERVICES  [facility]  [place]  Patient #:  [id number]  YOB: 1960        History of Present Illness     Chief Complaint   Patient presents with    6 Month Follow-Up     6 month follow-up     Congestion     Congestion due to pollen        Ms. Otoole is a 63 y.o. female  who presents for Follow up on chronic medical issues. There is compliance and tolerance with medications.    Chronic active medical issues assessed today include  Hypothyroidism, anxiety/depression, pre diabetes, hyperlipidemia..compliant with synthroid taking on an empty stomach. She compliant with Lexapro for anxiety and depression   Pollen with sorethroat and dry cough. Was cleaning her deck off this past weekend.   From drainage.   Has history of allergie. Has been taking mucinex. Taking claritin.   GERD  flare when she ran out of omeprazole.   Seeing Dr Genaro Díaz for veins care.     Last Labs  CBC:   Lab Results   Component Value Date/Time    WBC 7.0 10/06/2023 11:35 AM    RBC 4.71 10/06/2023 11:35 AM    HGB 14.2 10/06/2023 11:35 AM    HCT 43.8 10/06/2023 11:35 AM    MCV 93.0 10/06/2023 11:35 AM    MCH 30.1 10/06/2023 11:35 AM    MCHC 32.4 10/06/2023 11:35 AM    RDW 14.2 10/06/2023 11:35 AM     10/06/2023 11:35 AM    MPV 10.8 10/06/2023 11:35 AM     CMP:    Lab Results   Component Value Date/Time     10/06/2023 11:35 AM    K 4.5 10/06/2023 11:35 AM     10/06/2023 11:35 AM    CO2 27 10/06/2023 11:35 AM    BUN 11 10/06/2023 11:35 AM    CREATININE 0.80 10/06/2023 11:35 AM    GFRAA 107 02/07/2022 09:04 AM    AGRATIO 1.1 10/06/2023 11:35 AM    AGRATIO 1.6 03/27/2020 11:26 AM    LABGLOM >60 10/06/2023 11:35 AM    GLUCOSE 98 10/06/2023 11:35 AM    PROT 8.0 10/06/2023 11:35 AM    LABALBU 4.2 10/06/2023 11:35 AM    CALCIUM 9.9 10/06/2023 11:35 AM    BILITOT 0.5 10/06/2023 11:35 AM    ALKPHOS 68 10/06/2023 11:35 AM    ALKPHOS 67 03/27/2020 11:26 AM    AST 38

## 2024-04-10 NOTE — TELEPHONE ENCOUNTER
Pt came in to the office wanted to see if provider would call in an rx for albuterol hfa 90mcg (18) AER PRA  To [facility] [place]  The pulmonologist prescribed 05/16/2022 and she released pt.

## 2024-04-11 RX ORDER — ALBUTEROL SULFATE 90 UG/1
2 AEROSOL, METERED RESPIRATORY (INHALATION) 4 TIMES DAILY PRN
Qty: 54 G | Refills: 1 | Status: SHIPPED | OUTPATIENT
Start: 2024-04-11

## 2024-04-12 NOTE — RESULT ENCOUNTER NOTE
Cholesterol and Blood glucose levels were higher.   Was she fasting.? If so add on A1c. If she was not fasting then her glucose level is okay.  Rest of labs okay.

## 2024-04-15 ENCOUNTER — TELEPHONE (OUTPATIENT)
Dept: INTERNAL MEDICINE CLINIC | Facility: CLINIC | Age: 64
End: 2024-04-15

## 2024-04-15 NOTE — TELEPHONE ENCOUNTER
----- Message from Marek Smith MD sent at 4/12/2024  6:44 PM EDT -----  Cholesterol and Blood glucose levels were higher.   Was she fasting.? If so add on A1c. If she was not fasting then her glucose level is okay.  Rest of labs okay.

## 2024-04-16 ASSESSMENT — ENCOUNTER SYMPTOMS
COUGH: 1
RHINORRHEA: 1
SHORTNESS OF BREATH: 0

## 2024-05-01 ENCOUNTER — HOSPITAL ENCOUNTER (OUTPATIENT)
Dept: CT IMAGING | Age: 64
Discharge: HOME | End: 2024-05-04
Attending: INTERNAL MEDICINE
Payer: COMMERCIAL

## 2024-05-01 DIAGNOSIS — J84.9 ILD (INTERSTITIAL LUNG DISEASE) (HCC): ICD-10-CM

## 2024-05-01 PROCEDURE — 71250 CT THORAX DX C-: CPT

## 2024-05-02 NOTE — RESULT ENCOUNTER NOTE
Seems to have stable scarring in her lungs. There is some bronchiectasis mild mucus build up.   Recommend taking Mucines DM max strength Twice a day. This will help loosen up the mucus.   I also recommend going on [employer] and getting a flutter valve. Most insurance do not cover and it would be less expensive for the same thing on [employer]. They are about 20-40 dollars on [employer]. It will help knock the phlegm loose  Marek Smith MD

## 2024-05-03 ENCOUNTER — TELEPHONE (OUTPATIENT)
Dept: INTERNAL MEDICINE CLINIC | Facility: CLINIC | Age: 64
End: 2024-05-03

## 2024-05-03 NOTE — TELEPHONE ENCOUNTER
----- Message from Marek Smith MD sent at 5/2/2024  6:14 PM EDT -----  Seems to have stable scarring in her lungs. There is some bronchiectasis mild mucus build up.   Recommend taking Mucines DM max strength Twice a day. This will help loosen up the mucus.   I also recommend going on [employer] and getting a flutter valve. Most insurance do not cover and it would be less expensive for the same thing on [employer]. They are about 20-40 dollars on [employer]. It will help knock the phlegm loose  Marek Smith MD

## 2024-09-26 ENCOUNTER — TELEPHONE (OUTPATIENT)
Dept: INTERNAL MEDICINE CLINIC | Facility: CLINIC | Age: 64
End: 2024-09-26

## 2024-09-26 DIAGNOSIS — E03.9 ACQUIRED HYPOTHYROIDISM: ICD-10-CM

## 2024-09-26 RX ORDER — LEVOTHYROXINE SODIUM 75 UG/1
TABLET ORAL
Qty: 90 TABLET | Refills: 3 | Status: SHIPPED | OUTPATIENT
Start: 2024-09-26

## 2024-12-31 RX ORDER — ESCITALOPRAM OXALATE 10 MG/1
TABLET ORAL
Qty: 90 TABLET | Refills: 3 | Status: SHIPPED | OUTPATIENT
Start: 2024-12-31

## 2025-02-19 ENCOUNTER — TELEPHONE (OUTPATIENT)
Dept: INTERNAL MEDICINE CLINIC | Facility: CLINIC | Age: 65
End: 2025-02-19

## 2025-03-09 ASSESSMENT — PATIENT HEALTH QUESTIONNAIRE - PHQ9
2. FEELING DOWN, DEPRESSED OR HOPELESS: SEVERAL DAYS
2. FEELING DOWN, DEPRESSED OR HOPELESS: SEVERAL DAYS
8. MOVING OR SPEAKING SO SLOWLY THAT OTHER PEOPLE COULD HAVE NOTICED. OR THE OPPOSITE, BEING SO FIGETY OR RESTLESS THAT YOU HAVE BEEN MOVING AROUND A LOT MORE THAN USUAL: NOT AT ALL
1. LITTLE INTEREST OR PLEASURE IN DOING THINGS: SEVERAL DAYS
SUM OF ALL RESPONSES TO PHQ QUESTIONS 1-9: 5
SUM OF ALL RESPONSES TO PHQ QUESTIONS 1-9: 5
5. POOR APPETITE OR OVEREATING: SEVERAL DAYS
4. FEELING TIRED OR HAVING LITTLE ENERGY: SEVERAL DAYS
6. FEELING BAD ABOUT YOURSELF - OR THAT YOU ARE A FAILURE OR HAVE LET YOURSELF OR YOUR FAMILY DOWN: NOT AT ALL
5. POOR APPETITE OR OVEREATING: SEVERAL DAYS
SUM OF ALL RESPONSES TO PHQ QUESTIONS 1-9: 5
SUM OF ALL RESPONSES TO PHQ QUESTIONS 1-9: 5
8. MOVING OR SPEAKING SO SLOWLY THAT OTHER PEOPLE COULD HAVE NOTICED. OR THE OPPOSITE - BEING SO FIDGETY OR RESTLESS THAT YOU HAVE BEEN MOVING AROUND A LOT MORE THAN USUAL: NOT AT ALL
6. FEELING BAD ABOUT YOURSELF - OR THAT YOU ARE A FAILURE OR HAVE LET YOURSELF OR YOUR FAMILY DOWN: NOT AT ALL
SUM OF ALL RESPONSES TO PHQ QUESTIONS 1-9: 5
10. IF YOU CHECKED OFF ANY PROBLEMS, HOW DIFFICULT HAVE THESE PROBLEMS MADE IT FOR YOU TO DO YOUR WORK, TAKE CARE OF THINGS AT HOME, OR GET ALONG WITH OTHER PEOPLE: NOT DIFFICULT AT ALL
10. IF YOU CHECKED OFF ANY PROBLEMS, HOW DIFFICULT HAVE THESE PROBLEMS MADE IT FOR YOU TO DO YOUR WORK, TAKE CARE OF THINGS AT HOME, OR GET ALONG WITH OTHER PEOPLE: NOT DIFFICULT AT ALL
3. TROUBLE FALLING OR STAYING ASLEEP: NOT AT ALL
1. LITTLE INTEREST OR PLEASURE IN DOING THINGS: SEVERAL DAYS
4. FEELING TIRED OR HAVING LITTLE ENERGY: SEVERAL DAYS
7. TROUBLE CONCENTRATING ON THINGS, SUCH AS READING THE NEWSPAPER OR WATCHING TELEVISION: SEVERAL DAYS
7. TROUBLE CONCENTRATING ON THINGS, SUCH AS READING THE NEWSPAPER OR WATCHING TELEVISION: SEVERAL DAYS
3. TROUBLE FALLING OR STAYING ASLEEP: NOT AT ALL
9. THOUGHTS THAT YOU WOULD BE BETTER OFF DEAD, OR OF HURTING YOURSELF: NOT AT ALL
9. THOUGHTS THAT YOU WOULD BE BETTER OFF DEAD, OR OF HURTING YOURSELF: NOT AT ALL

## 2025-03-11 ENCOUNTER — OFFICE VISIT (OUTPATIENT)
Dept: INTERNAL MEDICINE CLINIC | Facility: CLINIC | Age: 65
End: 2025-03-11

## 2025-03-11 VITALS
HEART RATE: 65 BPM | TEMPERATURE: 97.2 F | DIASTOLIC BLOOD PRESSURE: 88 MMHG | HEIGHT: 65 IN | WEIGHT: 221 LBS | SYSTOLIC BLOOD PRESSURE: 155 MMHG | OXYGEN SATURATION: 96 % | BODY MASS INDEX: 36.82 KG/M2

## 2025-03-11 DIAGNOSIS — F33.1 MAJOR DEPRESSIVE DISORDER, RECURRENT, MODERATE (HCC): Primary | ICD-10-CM

## 2025-03-11 DIAGNOSIS — K21.9 GASTROESOPHAGEAL REFLUX DISEASE WITHOUT ESOPHAGITIS: ICD-10-CM

## 2025-03-11 DIAGNOSIS — J84.9 ILD (INTERSTITIAL LUNG DISEASE) (HCC): ICD-10-CM

## 2025-03-11 DIAGNOSIS — E66.01 SEVERE OBESITY: ICD-10-CM

## 2025-03-11 DIAGNOSIS — E03.9 ACQUIRED HYPOTHYROIDISM: ICD-10-CM

## 2025-03-11 LAB
ALBUMIN SERPL-MCNC: 3.8 G/DL (ref 3.2–4.6)
ALBUMIN/GLOB SERPL: 1 (ref 1–1.9)
ALP SERPL-CCNC: 67 U/L (ref 35–104)
ALT SERPL-CCNC: 41 U/L (ref 8–45)
ANION GAP SERPL CALC-SCNC: 13 MMOL/L (ref 7–16)
AST SERPL-CCNC: 44 U/L (ref 15–37)
BASOPHILS # BLD: 0.05 K/UL (ref 0–0.2)
BASOPHILS NFR BLD: 0.7 % (ref 0–2)
BILIRUB SERPL-MCNC: 0.4 MG/DL (ref 0–1.2)
BUN SERPL-MCNC: 12 MG/DL (ref 8–23)
CALCIUM SERPL-MCNC: 10.1 MG/DL (ref 8.8–10.2)
CHLORIDE SERPL-SCNC: 102 MMOL/L (ref 98–107)
CHOLEST SERPL-MCNC: 197 MG/DL (ref 0–200)
CO2 SERPL-SCNC: 24 MMOL/L (ref 20–29)
CREAT SERPL-MCNC: 0.63 MG/DL (ref 0.6–1.1)
DIFFERENTIAL METHOD BLD: NORMAL
EOSINOPHIL # BLD: 0.17 K/UL (ref 0–0.8)
EOSINOPHIL NFR BLD: 2.5 % (ref 0.5–7.8)
ERYTHROCYTE [DISTWIDTH] IN BLOOD BY AUTOMATED COUNT: 13.2 % (ref 11.9–14.6)
GLOBULIN SER CALC-MCNC: 4 G/DL (ref 2.3–3.5)
GLUCOSE SERPL-MCNC: 112 MG/DL (ref 70–99)
HCT VFR BLD AUTO: 43.7 % (ref 35.8–46.3)
HDLC SERPL-MCNC: 43 MG/DL (ref 40–60)
HDLC SERPL: 4.6 (ref 0–5)
HGB BLD-MCNC: 14.3 G/DL (ref 11.7–15.4)
IMM GRANULOCYTES # BLD AUTO: 0.01 K/UL (ref 0–0.5)
IMM GRANULOCYTES NFR BLD AUTO: 0.1 % (ref 0–5)
LDLC SERPL CALC-MCNC: 129 MG/DL (ref 0–100)
LYMPHOCYTES # BLD: 2.82 K/UL (ref 0.5–4.6)
LYMPHOCYTES NFR BLD: 40.9 % (ref 13–44)
MCH RBC QN AUTO: 29.8 PG (ref 26.1–32.9)
MCHC RBC AUTO-ENTMCNC: 32.7 G/DL (ref 31.4–35)
MCV RBC AUTO: 91 FL (ref 82–102)
MONOCYTES # BLD: 0.74 K/UL (ref 0.1–1.3)
MONOCYTES NFR BLD: 10.7 % (ref 4–12)
NEUTS SEG # BLD: 3.11 K/UL (ref 1.7–8.2)
NEUTS SEG NFR BLD: 45.1 % (ref 43–78)
NRBC # BLD: 0 K/UL (ref 0–0.2)
PLATELET # BLD AUTO: 239 K/UL (ref 150–450)
PMV BLD AUTO: 10.8 FL (ref 9.4–12.3)
POTASSIUM SERPL-SCNC: 4.4 MMOL/L (ref 3.5–5.1)
PROT SERPL-MCNC: 7.8 G/DL (ref 6.3–8.2)
RBC # BLD AUTO: 4.8 M/UL (ref 4.05–5.2)
SODIUM SERPL-SCNC: 139 MMOL/L (ref 136–145)
TRIGL SERPL-MCNC: 126 MG/DL (ref 0–150)
TSH W FREE THYROID IF ABNORMAL: 0.78 UIU/ML (ref 0.27–4.2)
VLDLC SERPL CALC-MCNC: 25 MG/DL (ref 6–23)
WBC # BLD AUTO: 6.9 K/UL (ref 4.3–11.1)

## 2025-03-11 PROCEDURE — 99214 OFFICE O/P EST MOD 30 MIN: CPT | Performed by: INTERNAL MEDICINE

## 2025-03-11 RX ORDER — ALBUTEROL SULFATE 90 UG/1
2 INHALANT RESPIRATORY (INHALATION) 4 TIMES DAILY PRN
Qty: 54 G | Refills: 1 | Status: CANCELLED | OUTPATIENT
Start: 2025-03-11

## 2025-03-11 RX ORDER — OMEPRAZOLE 20 MG/1
20 CAPSULE, DELAYED RELEASE ORAL
Qty: 90 CAPSULE | Refills: 3 | Status: CANCELLED | OUTPATIENT
Start: 2025-03-11

## 2025-03-11 SDOH — ECONOMIC STABILITY: FOOD INSECURITY: WITHIN THE PAST 12 MONTHS, YOU WORRIED THAT YOUR FOOD WOULD RUN OUT BEFORE YOU GOT MONEY TO BUY MORE.: NEVER TRUE

## 2025-03-11 SDOH — ECONOMIC STABILITY: FOOD INSECURITY: WITHIN THE PAST 12 MONTHS, THE FOOD YOU BOUGHT JUST DIDN'T LAST AND YOU DIDN'T HAVE MONEY TO GET MORE.: NEVER TRUE

## 2025-03-11 ASSESSMENT — PATIENT HEALTH QUESTIONNAIRE - PHQ9
1. LITTLE INTEREST OR PLEASURE IN DOING THINGS: NOT AT ALL
7. TROUBLE CONCENTRATING ON THINGS, SUCH AS READING THE NEWSPAPER OR WATCHING TELEVISION: NOT AT ALL
2. FEELING DOWN, DEPRESSED OR HOPELESS: NOT AT ALL
3. TROUBLE FALLING OR STAYING ASLEEP: NOT AT ALL
SUM OF ALL RESPONSES TO PHQ QUESTIONS 1-9: 4
4. FEELING TIRED OR HAVING LITTLE ENERGY: SEVERAL DAYS
10. IF YOU CHECKED OFF ANY PROBLEMS, HOW DIFFICULT HAVE THESE PROBLEMS MADE IT FOR YOU TO DO YOUR WORK, TAKE CARE OF THINGS AT HOME, OR GET ALONG WITH OTHER PEOPLE: NOT DIFFICULT AT ALL
8. MOVING OR SPEAKING SO SLOWLY THAT OTHER PEOPLE COULD HAVE NOTICED. OR THE OPPOSITE, BEING SO FIGETY OR RESTLESS THAT YOU HAVE BEEN MOVING AROUND A LOT MORE THAN USUAL: NOT AT ALL
9. THOUGHTS THAT YOU WOULD BE BETTER OFF DEAD, OR OF HURTING YOURSELF: NOT AT ALL
SUM OF ALL RESPONSES TO PHQ QUESTIONS 1-9: 4
5. POOR APPETITE OR OVEREATING: MORE THAN HALF THE DAYS
SUM OF ALL RESPONSES TO PHQ QUESTIONS 1-9: 4
SUM OF ALL RESPONSES TO PHQ QUESTIONS 1-9: 4
6. FEELING BAD ABOUT YOURSELF - OR THAT YOU ARE A FAILURE OR HAVE LET YOURSELF OR YOUR FAMILY DOWN: SEVERAL DAYS

## 2025-03-11 NOTE — PROGRESS NOTES
years 1-dose series) Never done    A1C test (Diabetic or Prediabetic)  02/22/2023    COVID-19 Vaccine (1 - 2024-25 season) Never done    Breast cancer screen  04/10/2025    Flu vaccine (1) 03/11/2026 (Originally 8/1/2024)    Depression Monitoring  03/09/2026    Lipids  04/09/2029    Hepatitis C screen  Completed    Hepatitis A vaccine  Aged Out    Hepatitis B vaccine  Aged Out    Hib vaccine  Aged Out    Polio vaccine  Aged Out    Meningococcal (ACWY) vaccine  Aged Out    Meningococcal B vaccine  Aged Out    Depression Screen  Discontinued             Review of Systems  Review of Systems    BP (!) 155/88 (BP Site: Right Upper Arm, Patient Position: Sitting, BP Cuff Size: Medium Adult)   Pulse 65   Temp 97.2 °F (36.2 °C) (Temporal)   Ht 1.651 m (5' 5\")   Wt 100.2 kg (221 lb)   LMP  (LMP Unknown)   SpO2 96%   BMI 36.78 kg/m²     Wt Readings from Last 3 Encounters:   03/11/25 100.2 kg (221 lb)   04/09/24 102.1 kg (225 lb)   10/06/23 101.2 kg (223 lb)       Physical Exam    Physical Exam  Skin:            Comments: Resolving hematoma right leg distal to knee.             Assessment & Plan           Encounter Diagnoses   Name Primary?    Major depressive disorder, recurrent, moderate Yes    Gastroesophageal reflux disease without esophagitis     Acquired hypothyroidism     ILD (interstitial lung disease) (HCC)     Severe obesity        No orders of the defined types were placed in this encounter.      Orders Placed This Encounter   Procedures    Lipid Panel     Standing Status:   Future     Number of Occurrences:   1     Expected Date:   3/11/2025     Expiration Date:   3/11/2026    CBC with Auto Differential     Standing Status:   Future     Number of Occurrences:   1     Expected Date:   3/11/2025     Expiration Date:   3/11/2026    Comprehensive Metabolic Panel     Standing Status:   Future     Number of Occurrences:   1     Expected Date:   3/11/2025     Expiration Date:   3/11/2026    TSH reflex to FT4

## 2025-03-14 ENCOUNTER — RESULTS FOLLOW-UP (OUTPATIENT)
Dept: INTERNAL MEDICINE CLINIC | Facility: CLINIC | Age: 65
End: 2025-03-14

## 2025-03-14 DIAGNOSIS — R73.01 ELEVATED FASTING GLUCOSE: Primary | ICD-10-CM

## 2025-03-14 NOTE — RESULT ENCOUNTER NOTE
Recent labs were reviewed. Glucose/Blood sugar was 112. Normal is less than 100..  Kidney and thyroid function  were normal.I will add on hgb A1c wihich relefts the average BS readings over the past 3 months.   Cholesterol is better.   Liver panel is stable. Just 1 enzyme is borderline elevated. Will continue to monitor.   Avoid sweet/sugary foods and beverages. Limit carbohydrates  in your diet. Carbohydrates like bread, pasta, rice and white potatoes are broken down by the body into glucose which is sugar. Sugar is a source of energy. So the more active you are the more sugar is burned off. Aim for 150 minutes of aerobic exercise over the course of a week. Walking is great exercise.    Have lab add on Ac

## 2025-03-18 ENCOUNTER — TELEPHONE (OUTPATIENT)
Dept: INTERNAL MEDICINE CLINIC | Facility: CLINIC | Age: 65
End: 2025-03-18

## 2025-03-18 NOTE — TELEPHONE ENCOUNTER
According to your records. You were scheduled to have a colonoscopy 10//2018 with [facility] but you cancelled.   IN 2023 you had EGD only  with [facility]  It seems you are due for a screening colonoscopy.  I can refer you back to [facility] if you agree.   Marek Smith MD

## (undated) DEVICE — SYRINGE MED 10ML LUERLOCK TIP W/O SFTY DISP

## (undated) DEVICE — YANKAUER,BULB TIP,W/O VENT,RIGID,STERILE: Brand: MEDLINE

## (undated) DEVICE — FORMALIN SOLUTION 20

## (undated) DEVICE — NEEDLE SYR 18GA L1.5IN RED PLAS HUB S STL BLNT FILL W/O

## (undated) DEVICE — GAUZE,SPONGE,4"X4",12PLY,WOVEN,NS,LF: Brand: MEDLINE

## (undated) DEVICE — CONNECTOR TBNG OD5-7MM O2 END DISP

## (undated) DEVICE — SYRINGE, LUER SLIP, STERILE, 60ML: Brand: MEDLINE

## (undated) DEVICE — FORCEPS BX L240CM JAW DIA2.8MM L CAP W/ NDL MIC MESH TOOTH

## (undated) DEVICE — AIRLIFE™ OXYGEN TUBING 7 FEET (2.1 M) CRUSH RESISTANT OXYGEN TUBING, VINYL TIPPED: Brand: AIRLIFE™

## (undated) DEVICE — HERCULES 3 STAGE BALLOON ESOPHAGEAL: Brand: HERCULES

## (undated) DEVICE — DEVICE INFLATION 60 CC INFLATORY

## (undated) DEVICE — SYRINGE MED 3ML CLR PLAS STD N CTRL LUERLOCK TIP DISP

## (undated) DEVICE — BLOCK BITE AD 60FR W/ VELC STRP ADDRESSES MOST PT AND

## (undated) DEVICE — CANNULA NSL ORAL AD FOR CAPNOFLEX CO2 O2 AIRLFE

## (undated) DEVICE — SINGLE PORT MANIFOLD: Brand: NEPTUNE 2

## (undated) DEVICE — LUBE JELLY FOIL PACK 1.4 OZ: Brand: MEDLINE INDUSTRIES, INC.

## (undated) DEVICE — KENDALL RADIOLUCENT FOAM MONITORING ELECTRODE RECTANGULAR SHAPE: Brand: KENDALL